# Patient Record
Sex: FEMALE
[De-identification: names, ages, dates, MRNs, and addresses within clinical notes are randomized per-mention and may not be internally consistent; named-entity substitution may affect disease eponyms.]

---

## 2017-01-10 PROBLEM — Z00.00 ENCOUNTER FOR PREVENTIVE HEALTH EXAMINATION: Status: ACTIVE | Noted: 2017-01-10

## 2017-01-17 ENCOUNTER — APPOINTMENT (OUTPATIENT)
Dept: ORTHOPEDIC SURGERY | Facility: CLINIC | Age: 22
End: 2017-01-17

## 2017-01-17 VITALS
BODY MASS INDEX: 16.07 KG/M2 | HEIGHT: 66 IN | SYSTOLIC BLOOD PRESSURE: 128 MMHG | WEIGHT: 100 LBS | DIASTOLIC BLOOD PRESSURE: 74 MMHG | HEART RATE: 90 BPM

## 2017-01-17 DIAGNOSIS — M25.562 PAIN IN RIGHT KNEE: ICD-10-CM

## 2017-01-17 DIAGNOSIS — M25.551 PAIN IN RIGHT HIP: ICD-10-CM

## 2017-01-17 DIAGNOSIS — M25.561 PAIN IN RIGHT KNEE: ICD-10-CM

## 2017-01-17 DIAGNOSIS — G89.29 PAIN IN RIGHT KNEE: ICD-10-CM

## 2017-01-17 DIAGNOSIS — M25.552 PAIN IN RIGHT HIP: ICD-10-CM

## 2021-11-12 ENCOUNTER — INPATIENT (INPATIENT)
Facility: HOSPITAL | Age: 26
LOS: 1 days | Discharge: HOME | End: 2021-11-14
Attending: INTERNAL MEDICINE | Admitting: INTERNAL MEDICINE
Payer: MEDICAID

## 2021-11-12 VITALS
TEMPERATURE: 98 F | RESPIRATION RATE: 18 BRPM | HEART RATE: 86 BPM | DIASTOLIC BLOOD PRESSURE: 68 MMHG | OXYGEN SATURATION: 100 % | SYSTOLIC BLOOD PRESSURE: 118 MMHG

## 2021-11-12 DIAGNOSIS — R00.2 PALPITATIONS: ICD-10-CM

## 2021-11-12 DIAGNOSIS — I49.3 VENTRICULAR PREMATURE DEPOLARIZATION: ICD-10-CM

## 2021-11-12 DIAGNOSIS — Z29.9 ENCOUNTER FOR PROPHYLACTIC MEASURES, UNSPECIFIED: ICD-10-CM

## 2021-11-12 DIAGNOSIS — Q87.40 MARFAN SYNDROME, UNSPECIFIED: ICD-10-CM

## 2021-11-12 LAB
A1C WITH ESTIMATED AVERAGE GLUCOSE RESULT: 5.1 % — SIGNIFICANT CHANGE UP (ref 4–5.6)
ALBUMIN SERPL ELPH-MCNC: 4.7 G/DL — SIGNIFICANT CHANGE UP (ref 3.3–5)
ALBUMIN SERPL ELPH-MCNC: 4.9 G/DL — SIGNIFICANT CHANGE UP (ref 3.3–5)
ALP SERPL-CCNC: 55 U/L — SIGNIFICANT CHANGE UP (ref 40–120)
ALP SERPL-CCNC: 55 U/L — SIGNIFICANT CHANGE UP (ref 40–120)
ALT FLD-CCNC: 13 U/L — SIGNIFICANT CHANGE UP (ref 4–33)
ALT FLD-CCNC: 16 U/L — SIGNIFICANT CHANGE UP (ref 4–33)
ANION GAP SERPL CALC-SCNC: 14 MMOL/L — SIGNIFICANT CHANGE UP (ref 7–14)
ANION GAP SERPL CALC-SCNC: 16 MMOL/L — HIGH (ref 7–14)
APPEARANCE UR: CLEAR — SIGNIFICANT CHANGE UP
APTT BLD: 31.3 SEC — SIGNIFICANT CHANGE UP (ref 27–36.3)
AST SERPL-CCNC: 19 U/L — SIGNIFICANT CHANGE UP (ref 4–32)
AST SERPL-CCNC: 26 U/L — SIGNIFICANT CHANGE UP (ref 4–32)
B-OH-BUTYR SERPL-SCNC: 3.3 MMOL/L — HIGH (ref 0–0.4)
BASE EXCESS BLDV CALC-SCNC: -4.9 MMOL/L — LOW (ref -2–3)
BASOPHILS # BLD AUTO: 0.05 K/UL — SIGNIFICANT CHANGE UP (ref 0–0.2)
BASOPHILS # BLD AUTO: 0.08 K/UL — SIGNIFICANT CHANGE UP (ref 0–0.2)
BASOPHILS NFR BLD AUTO: 0.5 % — SIGNIFICANT CHANGE UP (ref 0–2)
BASOPHILS NFR BLD AUTO: 1 % — SIGNIFICANT CHANGE UP (ref 0–2)
BILIRUB SERPL-MCNC: 0.5 MG/DL — SIGNIFICANT CHANGE UP (ref 0.2–1.2)
BILIRUB SERPL-MCNC: 0.6 MG/DL — SIGNIFICANT CHANGE UP (ref 0.2–1.2)
BILIRUB UR-MCNC: NEGATIVE — SIGNIFICANT CHANGE UP
BLD GP AB SCN SERPL QL: NEGATIVE — SIGNIFICANT CHANGE UP
BLOOD GAS VENOUS COMPREHENSIVE RESULT: SIGNIFICANT CHANGE UP
BUN SERPL-MCNC: 14 MG/DL — SIGNIFICANT CHANGE UP (ref 7–23)
BUN SERPL-MCNC: 18 MG/DL — SIGNIFICANT CHANGE UP (ref 7–23)
CALCIUM SERPL-MCNC: 9.1 MG/DL — SIGNIFICANT CHANGE UP (ref 8.4–10.5)
CALCIUM SERPL-MCNC: 9.5 MG/DL — SIGNIFICANT CHANGE UP (ref 8.4–10.5)
CHLORIDE BLDV-SCNC: 101 MMOL/L — SIGNIFICANT CHANGE UP (ref 96–108)
CHLORIDE SERPL-SCNC: 101 MMOL/L — SIGNIFICANT CHANGE UP (ref 98–107)
CHLORIDE SERPL-SCNC: 102 MMOL/L — SIGNIFICANT CHANGE UP (ref 98–107)
CO2 BLDV-SCNC: 23 MMOL/L — SIGNIFICANT CHANGE UP (ref 22–26)
CO2 SERPL-SCNC: 20 MMOL/L — LOW (ref 22–31)
CO2 SERPL-SCNC: 20 MMOL/L — LOW (ref 22–31)
COLOR SPEC: SIGNIFICANT CHANGE UP
CREAT SERPL-MCNC: 0.53 MG/DL — SIGNIFICANT CHANGE UP (ref 0.5–1.3)
CREAT SERPL-MCNC: 0.57 MG/DL — SIGNIFICANT CHANGE UP (ref 0.5–1.3)
DIFF PNL FLD: NEGATIVE — SIGNIFICANT CHANGE UP
EOSINOPHIL # BLD AUTO: 0.01 K/UL — SIGNIFICANT CHANGE UP (ref 0–0.5)
EOSINOPHIL # BLD AUTO: 0.03 K/UL — SIGNIFICANT CHANGE UP (ref 0–0.5)
EOSINOPHIL NFR BLD AUTO: 0.1 % — SIGNIFICANT CHANGE UP (ref 0–6)
EOSINOPHIL NFR BLD AUTO: 0.4 % — SIGNIFICANT CHANGE UP (ref 0–6)
ESTIMATED AVERAGE GLUCOSE: 100 — SIGNIFICANT CHANGE UP
GAS PNL BLDV: 133 MMOL/L — LOW (ref 136–145)
GAS PNL BLDV: SIGNIFICANT CHANGE UP
GLUCOSE BLDC GLUCOMTR-MCNC: 132 MG/DL — HIGH (ref 70–99)
GLUCOSE BLDC GLUCOMTR-MCNC: 132 MG/DL — HIGH (ref 70–99)
GLUCOSE BLDC GLUCOMTR-MCNC: 217 MG/DL — HIGH (ref 70–99)
GLUCOSE BLDC GLUCOMTR-MCNC: 51 MG/DL — CRITICAL LOW (ref 70–99)
GLUCOSE BLDC GLUCOMTR-MCNC: 56 MG/DL — LOW (ref 70–99)
GLUCOSE BLDV-MCNC: 191 MG/DL — HIGH (ref 70–99)
GLUCOSE SERPL-MCNC: 183 MG/DL — HIGH (ref 70–99)
GLUCOSE SERPL-MCNC: 66 MG/DL — LOW (ref 70–99)
GLUCOSE UR QL: NEGATIVE — SIGNIFICANT CHANGE UP
HCG SERPL-ACNC: <5 MIU/ML — SIGNIFICANT CHANGE UP
HCO3 BLDV-SCNC: 22 MMOL/L — SIGNIFICANT CHANGE UP (ref 22–29)
HCT VFR BLD CALC: 34.8 % — SIGNIFICANT CHANGE UP (ref 34.5–45)
HCT VFR BLD CALC: 35.7 % — SIGNIFICANT CHANGE UP (ref 34.5–45)
HCT VFR BLDA CALC: 36 % — SIGNIFICANT CHANGE UP (ref 34.5–46.5)
HGB BLD CALC-MCNC: 11.9 G/DL — SIGNIFICANT CHANGE UP (ref 11.5–15.5)
HGB BLD-MCNC: 11.7 G/DL — SIGNIFICANT CHANGE UP (ref 11.5–15.5)
HGB BLD-MCNC: 12 G/DL — SIGNIFICANT CHANGE UP (ref 11.5–15.5)
IANC: 5.69 K/UL — SIGNIFICANT CHANGE UP (ref 1.5–8.5)
IANC: 9.54 K/UL — HIGH (ref 1.5–8.5)
IMM GRANULOCYTES NFR BLD AUTO: 0.2 % — SIGNIFICANT CHANGE UP (ref 0–1.5)
IMM GRANULOCYTES NFR BLD AUTO: 0.3 % — SIGNIFICANT CHANGE UP (ref 0–1.5)
INR BLD: 1.16 RATIO — SIGNIFICANT CHANGE UP (ref 0.88–1.16)
KETONES UR-MCNC: ABNORMAL
LACTATE BLDV-MCNC: 1.2 MMOL/L — SIGNIFICANT CHANGE UP (ref 0.5–2)
LEUKOCYTE ESTERASE UR-ACNC: NEGATIVE — SIGNIFICANT CHANGE UP
LYMPHOCYTES # BLD AUTO: 0.83 K/UL — LOW (ref 1–3.3)
LYMPHOCYTES # BLD AUTO: 1.86 K/UL — SIGNIFICANT CHANGE UP (ref 1–3.3)
LYMPHOCYTES # BLD AUTO: 22.9 % — SIGNIFICANT CHANGE UP (ref 13–44)
LYMPHOCYTES # BLD AUTO: 7.7 % — LOW (ref 13–44)
MAGNESIUM SERPL-MCNC: 2.2 MG/DL — SIGNIFICANT CHANGE UP (ref 1.6–2.6)
MCHC RBC-ENTMCNC: 29.1 PG — SIGNIFICANT CHANGE UP (ref 27–34)
MCHC RBC-ENTMCNC: 29.3 PG — SIGNIFICANT CHANGE UP (ref 27–34)
MCHC RBC-ENTMCNC: 33.6 GM/DL — SIGNIFICANT CHANGE UP (ref 32–36)
MCHC RBC-ENTMCNC: 33.6 GM/DL — SIGNIFICANT CHANGE UP (ref 32–36)
MCV RBC AUTO: 86.7 FL — SIGNIFICANT CHANGE UP (ref 80–100)
MCV RBC AUTO: 87.2 FL — SIGNIFICANT CHANGE UP (ref 80–100)
MONOCYTES # BLD AUTO: 0.36 K/UL — SIGNIFICANT CHANGE UP (ref 0–0.9)
MONOCYTES # BLD AUTO: 0.45 K/UL — SIGNIFICANT CHANGE UP (ref 0–0.9)
MONOCYTES NFR BLD AUTO: 3.3 % — SIGNIFICANT CHANGE UP (ref 2–14)
MONOCYTES NFR BLD AUTO: 5.5 % — SIGNIFICANT CHANGE UP (ref 2–14)
NEUTROPHILS # BLD AUTO: 5.69 K/UL — SIGNIFICANT CHANGE UP (ref 1.8–7.4)
NEUTROPHILS # BLD AUTO: 9.54 K/UL — HIGH (ref 1.8–7.4)
NEUTROPHILS NFR BLD AUTO: 70 % — SIGNIFICANT CHANGE UP (ref 43–77)
NEUTROPHILS NFR BLD AUTO: 88.1 % — HIGH (ref 43–77)
NITRITE UR-MCNC: NEGATIVE — SIGNIFICANT CHANGE UP
NRBC # BLD: 0 /100 WBCS — SIGNIFICANT CHANGE UP
NRBC # BLD: 0 /100 WBCS — SIGNIFICANT CHANGE UP
NRBC # FLD: 0 K/UL — SIGNIFICANT CHANGE UP
NRBC # FLD: 0 K/UL — SIGNIFICANT CHANGE UP
NT-PROBNP SERPL-SCNC: 319 PG/ML — HIGH
PCO2 BLDV: 45 MMHG — HIGH (ref 39–42)
PH BLDV: 7.29 — LOW (ref 7.32–7.43)
PH UR: 6.5 — SIGNIFICANT CHANGE UP (ref 5–8)
PHOSPHATE SERPL-MCNC: 2.7 MG/DL — SIGNIFICANT CHANGE UP (ref 2.5–4.5)
PLATELET # BLD AUTO: 194 K/UL — SIGNIFICANT CHANGE UP (ref 150–400)
PLATELET # BLD AUTO: 211 K/UL — SIGNIFICANT CHANGE UP (ref 150–400)
PO2 BLDV: 33 MMHG — SIGNIFICANT CHANGE UP
POTASSIUM BLDV-SCNC: 3.8 MMOL/L — SIGNIFICANT CHANGE UP (ref 3.5–5.1)
POTASSIUM SERPL-MCNC: 3.7 MMOL/L — SIGNIFICANT CHANGE UP (ref 3.5–5.3)
POTASSIUM SERPL-MCNC: 3.7 MMOL/L — SIGNIFICANT CHANGE UP (ref 3.5–5.3)
POTASSIUM SERPL-SCNC: 3.7 MMOL/L — SIGNIFICANT CHANGE UP (ref 3.5–5.3)
POTASSIUM SERPL-SCNC: 3.7 MMOL/L — SIGNIFICANT CHANGE UP (ref 3.5–5.3)
PROT SERPL-MCNC: 7.7 G/DL — SIGNIFICANT CHANGE UP (ref 6–8.3)
PROT SERPL-MCNC: 7.8 G/DL — SIGNIFICANT CHANGE UP (ref 6–8.3)
PROT UR-MCNC: ABNORMAL
PROTHROM AB SERPL-ACNC: 13.1 SEC — SIGNIFICANT CHANGE UP (ref 10.6–13.6)
RBC # BLD: 3.99 M/UL — SIGNIFICANT CHANGE UP (ref 3.8–5.2)
RBC # BLD: 4.12 M/UL — SIGNIFICANT CHANGE UP (ref 3.8–5.2)
RBC # FLD: 12.4 % — SIGNIFICANT CHANGE UP (ref 10.3–14.5)
RBC # FLD: 12.5 % — SIGNIFICANT CHANGE UP (ref 10.3–14.5)
RH IG SCN BLD-IMP: POSITIVE — SIGNIFICANT CHANGE UP
SAO2 % BLDV: 53.7 % — SIGNIFICANT CHANGE UP
SARS-COV-2 RNA SPEC QL NAA+PROBE: SIGNIFICANT CHANGE UP
SODIUM SERPL-SCNC: 135 MMOL/L — SIGNIFICANT CHANGE UP (ref 135–145)
SODIUM SERPL-SCNC: 138 MMOL/L — SIGNIFICANT CHANGE UP (ref 135–145)
SP GR SPEC: >1.05 (ref 1–1.05)
TROPONIN T, HIGH SENSITIVITY RESULT: <6 NG/L — SIGNIFICANT CHANGE UP
UROBILINOGEN FLD QL: SIGNIFICANT CHANGE UP
WBC # BLD: 10.82 K/UL — HIGH (ref 3.8–10.5)
WBC # BLD: 8.13 K/UL — SIGNIFICANT CHANGE UP (ref 3.8–10.5)
WBC # FLD AUTO: 10.82 K/UL — HIGH (ref 3.8–10.5)
WBC # FLD AUTO: 8.13 K/UL — SIGNIFICANT CHANGE UP (ref 3.8–10.5)

## 2021-11-12 PROCEDURE — 99233 SBSQ HOSP IP/OBS HIGH 50: CPT | Mod: 25

## 2021-11-12 PROCEDURE — 93306 TTE W/DOPPLER COMPLETE: CPT | Mod: 26

## 2021-11-12 PROCEDURE — 71275 CT ANGIOGRAPHY CHEST: CPT | Mod: 26,MG

## 2021-11-12 PROCEDURE — 75635 CT ANGIO ABDOMINAL ARTERIES: CPT | Mod: 26,MA

## 2021-11-12 PROCEDURE — 99223 1ST HOSP IP/OBS HIGH 75: CPT | Mod: GC

## 2021-11-12 PROCEDURE — G1004: CPT

## 2021-11-12 PROCEDURE — 93280 PM DEVICE PROGR EVAL DUAL: CPT | Mod: 26

## 2021-11-12 PROCEDURE — 99285 EMERGENCY DEPT VISIT HI MDM: CPT

## 2021-11-12 RX ORDER — LOSARTAN POTASSIUM 100 MG/1
25 TABLET, FILM COATED ORAL
Refills: 0 | Status: DISCONTINUED | OUTPATIENT
Start: 2021-11-12 | End: 2021-11-12

## 2021-11-12 RX ORDER — DEXTROSE 50 % IN WATER 50 %
50 SYRINGE (ML) INTRAVENOUS ONCE
Refills: 0 | Status: COMPLETED | OUTPATIENT
Start: 2021-11-12 | End: 2021-11-12

## 2021-11-12 RX ORDER — ACETAMINOPHEN 500 MG
650 TABLET ORAL ONCE
Refills: 0 | Status: COMPLETED | OUTPATIENT
Start: 2021-11-12 | End: 2021-11-12

## 2021-11-12 RX ORDER — ATENOLOL 25 MG/1
25 TABLET ORAL DAILY
Refills: 0 | Status: DISCONTINUED | OUTPATIENT
Start: 2021-11-12 | End: 2021-11-12

## 2021-11-12 RX ORDER — ASPIRIN/CALCIUM CARB/MAGNESIUM 324 MG
81 TABLET ORAL DAILY
Refills: 0 | Status: DISCONTINUED | OUTPATIENT
Start: 2021-11-12 | End: 2021-11-14

## 2021-11-12 RX ORDER — MORPHINE SULFATE 50 MG/1
4 CAPSULE, EXTENDED RELEASE ORAL ONCE
Refills: 0 | Status: DISCONTINUED | OUTPATIENT
Start: 2021-11-12 | End: 2021-11-12

## 2021-11-12 RX ORDER — ENOXAPARIN SODIUM 100 MG/ML
40 INJECTION SUBCUTANEOUS DAILY
Refills: 0 | Status: DISCONTINUED | OUTPATIENT
Start: 2021-11-12 | End: 2021-11-12

## 2021-11-12 RX ORDER — ACETAMINOPHEN 500 MG
650 TABLET ORAL EVERY 6 HOURS
Refills: 0 | Status: DISCONTINUED | OUTPATIENT
Start: 2021-11-12 | End: 2021-11-14

## 2021-11-12 RX ORDER — ENOXAPARIN SODIUM 100 MG/ML
40 INJECTION SUBCUTANEOUS DAILY
Refills: 0 | Status: DISCONTINUED | OUTPATIENT
Start: 2021-11-12 | End: 2021-11-14

## 2021-11-12 RX ORDER — ONDANSETRON 8 MG/1
4 TABLET, FILM COATED ORAL ONCE
Refills: 0 | Status: COMPLETED | OUTPATIENT
Start: 2021-11-12 | End: 2021-11-12

## 2021-11-12 RX ORDER — ATENOLOL 25 MG/1
25 TABLET ORAL DAILY
Refills: 0 | Status: DISCONTINUED | OUTPATIENT
Start: 2021-11-12 | End: 2021-11-14

## 2021-11-12 RX ADMIN — MORPHINE SULFATE 4 MILLIGRAM(S): 50 CAPSULE, EXTENDED RELEASE ORAL at 18:02

## 2021-11-12 RX ADMIN — Medication 50 MILLILITER(S): at 18:47

## 2021-11-12 RX ADMIN — Medication 650 MILLIGRAM(S): at 18:12

## 2021-11-12 RX ADMIN — ONDANSETRON 4 MILLIGRAM(S): 8 TABLET, FILM COATED ORAL at 18:12

## 2021-11-12 RX ADMIN — MORPHINE SULFATE 4 MILLIGRAM(S): 50 CAPSULE, EXTENDED RELEASE ORAL at 15:01

## 2021-11-12 NOTE — ED PROVIDER NOTE - NSICDXPASTMEDICALHX_GEN_ALL_CORE_FT
PAST MEDICAL HISTORY:  History of open heart surgery     History of transcatheter aortic valve replacement (TAVR)     Marfan syndrome

## 2021-11-12 NOTE — H&P ADULT - NSHPSOCIALHISTORY_GEN_ALL_CORE
Patient denies tobacco use. Endorses previous alcohol use (8 shots per weekend) but has not had any alcohol in two weeks. Denies illicit drug use. Not sexually active.

## 2021-11-12 NOTE — H&P ADULT - PROBLEM SELECTOR PLAN 2
Patient with a history of Marfan syndrome  - management of palpitations as above   - CT with multilobulated meningoceles within the sacrum along with dural ectasia.   - patient previously referred for MRI but has not had scan   - neuro consult? Patient with a history of Marfan syndrome  - management of palpitations as above   - CT with multilobulated meningoceles within the sacrum along with dural ectasia.   - patient previously referred for MRI but has not had scan   - neuro consult 11/13

## 2021-11-12 NOTE — ED ADULT NURSE NOTE - OBJECTIVE STATEMENT
Receive pt. in ER room 15 alert and oriented x 4, presenting to the ER with complaints of midsternal chest pain . Pt. stated " I was at work this morning and I  have chest pain in the middle of my chest, I went to my doctor and I was sent to the ER". Place on cardiac monitor, labs sent. No respiratory distress, will continue to monitor.

## 2021-11-12 NOTE — H&P ADULT - PROBLEM SELECTOR PLAN 1
Patient with history of Marfan syndrome, presenting with palpitations   - EP following: PPM interrogation revealed normal device functions without VT or NSVT and PVC's up to 312 beats per hour since March 2021.  Total V pacing around 3%.    - CT of chest ruled out aortic dissection and hematoma.    - Patient has normal LVEF 60% and no hx of heart failure per PPM records.    - Admit to tele   -Continue Atenolol 25mg  -Echocardiogram to assess valvular conditions Patient with history of Marfan syndrome, presenting with palpitations   - EP following: PPM interrogation revealed normal device functions without VT or NSVT and PVC's up to 312 beats per hour since March 2021.  Total V pacing around 3%.    - CT of chest ruled out aortic dissection and hematoma.    - Patient has normal LVEF 60% and no hx of heart failure per PPM records.    - Admit to tele   -Continue Atenolol 25mg  -Echocardiogram to for CHF and valvular conditions Patient with history of Marfan syndrome, presenting with palpitations   - EP following: PPM interrogation revealed normal device functions without VT or NSVT and PVC's up to 312 beats per hour since March 2021.  Total V pacing around 3%.    - CT of chest ruled out aortic dissection and hematoma.    - Patient has normal LVEF 60% and no hx of heart failure per PPM records  - Admit to tele   -Continue Atenolol 25mg, asa, losartan   -Echocardiogram to for CHF and valvular conditions

## 2021-11-12 NOTE — ED PROVIDER NOTE - OBJECTIVE STATEMENT
27yo F w/ history of Marfan syndrome, open heart sx 2012 and 2017, TAVR Mar 2021 coming in w/ acute onset midsternal chest pain radiating to the back associated w/ SOB that began after running at work. Has never had similar symptoms in the past; went to the cardiologist and was tachy to the 140s and was told to come to the ED.

## 2021-11-12 NOTE — H&P ADULT - NSHPLABSRESULTS_GEN_ALL_CORE
CBC Full  -  ( 12 Nov 2021 13:51 )  WBC Count : 8.13 K/uL  RBC Count : 4.12 M/uL  Hemoglobin : 12.0 g/dL  Hematocrit : 35.7 %  Platelet Count - Automated : 211 K/uL  Mean Cell Volume : 86.7 fL  Mean Cell Hemoglobin : 29.1 pg  Mean Cell Hemoglobin Concentration : 33.6 gm/dL  Auto Neutrophil # : 5.69 K/uL  Auto Lymphocyte # : 1.86 K/uL  Auto Monocyte # : 0.45 K/uL  Auto Eosinophil # : 0.03 K/uL  Auto Basophil # : 0.08 K/uL  Auto Neutrophil % : 70.0 %  Auto Lymphocyte % : 22.9 %  Auto Monocyte % : 5.5 %  Auto Eosinophil % : 0.4 %  Auto Basophil % : 1.0 %    Comprehensive Metabolic Panel (11.12.21 @ 13:51)    Sodium, Serum: 138 mmol/L    Potassium, Serum: 3.7: SPECIMEN MILDLY HEMOLYZED mmol/L    Chloride, Serum: 102 mmol/L    Carbon Dioxide, Serum: 20 mmol/L    Anion Gap, Serum: 16 mmol/L    Blood Urea Nitrogen, Serum: 18 mg/dL    Creatinine, Serum: 0.57 mg/dL    Glucose, Serum: 66 mg/dL    Calcium, Total Serum: 9.5 mg/dL    Protein Total, Serum: 7.8: SPECIMEN MILDLY HEMOLYZED g/dL    Albumin, Serum: 4.9 g/dL    Bilirubin Total, Serum: 0.6 mg/dL    Alkaline Phosphatase, Serum: 55 U/L    Aspartate Aminotransferase (AST/SGOT): 26: SPECIMEN MILDLY HEMOLYZED U/L    Alanine Aminotransferase (ALT/SGPT): 16: SPECIMEN MILDLY HEMOLYZED U/L    eGFR if Non : 126: The units for eGFR are ml/min/1.73m2 (normalized body surface area). The  eGFR is calculated from a serum creatinine using the CKD-EPI equation.  Other variables required for calculation are race, age and sex. Among  patients with chronic kidney disease (CKD), the eGFR is useful in  determining the stage of disease according to KDOQI CKD classification.  All eGFR results are reported numerically with the following  interpretation.      GFR  (ml/min/1.73 m2)          W/KIDNEY DAMAGE    W/O KIDNEY DMG  ==========================================================  >= 90.......................Stage 1..............Normal  60-89.......................Stage 2...........Decreased GFR  30-59.......................Stage 3..............Stage 3  15-29.......................Stage 4..............Stage 4  < 15........................Stage 5..............Stage 5  Each stage of CKD assumes that the associated GFR level has been in  effect for at least 3 months. Determination of stages one and two (with  eGFR > 59ml/min/m2) requires estimation of kidney damage for at least 3  months as defined by structural or functional abnormalities.  Limitations: All estimates of GFR will be less accurate for patients at  extremes of muscle mass (including but not limited to frail elderly,  critically ill, or cancer patients), those with unusual diets, and those  with conditions associated with reduced secretion or extrarenal  elimination of creatinine. The eGFR equation is not recommended for use  in patients with unstable creatinine levels. mL/min/1.73M2    eGFR if African American: 146 mL/min/1.73M2      < from: CT Angio Chest Aorta w/wo IV Cont (11.12.21 @ 13:58) >      No intramural hematoma or aortic dissection.    Multilobulated meningoceles within the sacrum along with dural ectasia. Correlate for history of neurofibromatosis.      < end of copied text >

## 2021-11-12 NOTE — CONSULT NOTE ADULT - SUBJECTIVE AND OBJECTIVE BOX
Patient is a 26y old  Female who presents with a chief complaint of         HPI:  26 year old female with a PMH of Marfan syndrome s/p MVR 2012 and s/p AVR and  ascending aortic repair  in 2017 s/p transcatheter aortic valve replacement (TAVR) 3/2021 and s/p PPM ([employer]) for occasional 2 rd and 3 rd degree AVB post TAVR in 3/28/2021, scoliosis s/p repair with rods, s/p limbs deformatum with chito foot surgery and R hand surgery.  Patient recently moved from [place] to [place] and started a new job last weekend.  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  She describes "became lightheadedness and felt like passing out".  She went to see cardiologist Dr. Albright who sent her to ED to be seen by EP Dr. Yancey for symptomatic PVC's.  PPM interrogation revealed normal device functions without VT or NSVT and PVC's up to 312 beats per hour since March 2021.  Total V pacing around 3%.  She is maintained on Atenolol 25 mg daily.  CT of chest ruled out aortic dissection and hematoma.  Patient has normal LVEF 60% and no hx of heart failure per PPM records.          PAST MEDICAL & SURGICAL HISTORY:  Marfan syndrome    History of open heart surgery s/p MVR 2012 and s/p AVR and  ascending aortic repair  in 2017    History of transcatheter aortic valve replacement (TAVR) 3/2021 and s/p PPM ([employer]tr[employer]) in 3/28/2021  s/p chito foot surgery and R hand surgery  s/p spine surgery for scoliosis         MEDICATIONS  (STANDING): Atenolol 25 mg daily; ASA 81 mg daily; Losartan 25 mg BID    MEDICATIONS  (PRN):    Allergies    No Known Allergies    Intolerances      FAMILY HISTORY: N/A      SOCIAL HISTORY:  Denies smoking; no   Alcohol  or  Drug abuse     REVIEW OF SYSTEMS:    CONSTITUTIONAL: No fever, weight loss, chills, shakes, or fatigue  EYES: No eye pain, visual disturbances, or discharge  ENMT:  No difficulty hearing, tinnitus, vertigo; No sinus or throat pain  NECK: No pain or stiffness  RESPIRATORY: No cough, wheezing, hemoptysis, or shortness of breath  CARDIOVASCULAR: No syncope, paroxysmal nocturnal dyspnea, orthopnea, or arm or leg swelling +chest pain, +dyspnea, +palpitations, +dizziness,   GASTROINTESTINAL: No abdominal  or epigastric pain, nausea, vomiting, hematemesis, diarrhea, constipation, melena or bright red blood.  GENITOURINARY: No dysuria, nocturia, hematuria, or urinary incontinence  NEUROLOGICAL: No headaches, memory loss, slurred speech, limb weakness, loss of strength,  or tremors +numbness/tingling of chito feet  SKIN: No itching, burning, rashes, or lesions   LYMPH NODES: No enlarged glands  ENDOCRINE: No heat or cold intolerance, or hair loss  MUSCULOSKELETAL: No joint pain or swelling, muscle, back, or extremity pain  PSYCHIATRIC: No depression, anxiety, or difficulty sleeping  HEME/LYMPH: No easy bruising or bleeding gums  ALLERY AND IMMUNOLOGIC: No hives or rash.      Vital Signs Last 24 Hrs  T(C): 36.8 (12 Nov 2021 12:59), Max: 36.8 (12 Nov 2021 12:59)  T(F): 98.2 (12 Nov 2021 12:59), Max: 98.2 (12 Nov 2021 12:59)  HR: 85 (12 Nov 2021 14:27) (85 - 86)  BP: 104/67 (12 Nov 2021 14:27) (104/67 - 118/68)  BP(mean): --  RR: 16 (12 Nov 2021 14:27) (16 - 18)  SpO2: 100% (12 Nov 2021 14:27) (100% - 100%)    PHYSICAL EXAM:    GENERAL: In no apparent distress, well nourished, and hydrated.  HEAD:  Atraumatic, Normocephalic  NECK: Supple . No JVD or carotid bruit or thyroidmegaly.  Carotid pulse is 2+ bilaterally.  PULMONARY: Clear to auscultation and perfusion.  No rales, wheezing, or rhonchi bilaterally.  HEART: Regular rate and rhythm; 1/6 murmurs, rubs, or gallops.  L PPM  ABDOMEN: Soft, Nontender, Nondistended; Bowel sounds present  EXTREMITIES: No clubbing, cyanosis, or edema  NEUROLOGICAL: Alert oriented to person, place and time.  Speech clear.  Skin: Dry intact, no rashes +scars              INTERPRETATION OF TELEMETRY:  Sinus rhythm with occasional PVC's    ECG: SR, RBBB        LABS:                        12.0   8.13  )-----------( 211      ( 12 Nov 2021 13:51 )             35.7     11-12    138  |  102  |  18  ----------------------------<  66<L>  3.7   |  20<L>  |  0.57    Ca    9.5      12 Nov 2021 13:51    TPro  7.8  /  Alb  4.9  /  TBili  0.6  /  DBili  x   /  AST  26  /  ALT  16  /  AlkPhos  55  11-12        PT/INR - ( 12 Nov 2021 13:51 )   PT: 13.1 sec;   INR: 1.16 ratio         PTT - ( 12 Nov 2021 13:51 )  PTT:31.3 sec      BNPSerum Pro-Brain Natriuretic Peptide: 319 pg/mL (11-12 @ 13:51)    RADIOLOGY & ADDITIONAL STUDIES:  PREVIOUS DIAGNOSTIC TESTING:      ECHO FINDINGS:    STRESS FINDINGS:    CATHETERIZATION FINDINGS:

## 2021-11-12 NOTE — CONSULT NOTE ADULT - ASSESSMENT
26 year old female with a PMH of Marfan syndrome s/p MVR 2012 and s/p AVR and  ascending aortic repair  in 2017 s/p transcatheter aortic valve replacement (TAVR) 3/2021 and s/p PPM ([employer]) for occasional 2 rd and 3 rd degree AVB post TAVR in 3/28/2021, scoliosis s/p repair with rods, s/p limbs deformatum with chito foot surgery and R hand surgery.  PPM interrogation revealed normal device functions without VT or NSVT and PVC's up to 312 beats per hour since March 2021.  Total V pacing around 3%.  She is maintained on Atenolol 25 mg daily.  Patient 's symptoms probably secondary to frequent ectopy associated with exertion.   CT of chest ruled out aortic dissection and hematoma.  Patient has normal LVEF 60% and no hx of heart failure per PPM records.    -Admit to Hospitalist service to tele bed, telemetry monitor   -EKG / rhythm strips to record PVC's  -Continue Atenolol 25mg, may uptitrate to 50mg daily for frequent PVC's   -Echocardiogram to assess valvular conditions   -Will discuss with Dr. Yancey, patient may be a candidate for PVC's ablation vs antiarrhythmic drug for ectopy suppression

## 2021-11-12 NOTE — CONSULT NOTE ADULT - ATTENDING COMMENTS
26 year old female with a PMH of Marfan syndrome s/p MVR 2012 and s/p AVR and  ascending aortic repair  in 2017 s/p transcatheter aortic valve replacement (TAVR) 3/2021 and s/p PPM ([employer]) for occasional 2 rd and 3 rd degree AVB post TAVR in 3/28/2021, scoliosis s/p repair with rods, s/p limbs deformatum with chito foot surgery and R hand surgery.  PPM interrogation revealed normal device functions without VT or NSVT and PVC's up to 312 beats per hour since March 2021.  Total V pacing around 3%.  She is maintained on Atenolol 25 mg daily.  Patient 's symptoms probably secondary to frequent ectopy associated with exertion.   CT of chest ruled out aortic dissection and PE.  Patient has normal LVEF 60% and no hx of heart failure per PPM records.    Will plan for TTE and follow.

## 2021-11-12 NOTE — H&P ADULT - ATTENDING COMMENTS
Patient seen and examined on 11/12 at 1800.     26 y.o. F w/ a hx of Marfan’s syndrome s/p aortic root repair, MVR, TAVR, scoliosis, high degree AVB s/p PPM who presents with acute on chronic SOB. Patient recently underwent TAVR repair in March 2021 for severe SOB. Dyspnea improved until 3 months ago when she noted she was able to walk 5 blocks before [place] compared to 10 blocks. She became SOB after climbing one flight of stairs. Patient recently moved from [place] and has not seen her cardiologist since developing SOB. Does not remember when she had her last TTE. Pt was at work today, running around when she noted severe SOB associated with central chest pressure with involvement of L arm. Patient does report chronic L shoulder/arm pain since PPM placement. Since arrival to the ED, patient has been nauseous, she believes 2/2 lack of PO intake. PE notable for +occasional dry heaving. Labs unremarkable. CTA chest negative for aortic dissection, PNX, PNA but does demonstrate multilobulated meningoceles in sacrum.     # SOB, chest pressure: CTA chest negative for aortic dissection. Check TTE given hx of MVR, TAVR, evaluate valve function. DD includes symptomatic ectopy- EP following. Try to obtain records from prior providers. Monitor on tele.   # Meningocele: F/u OP.

## 2021-11-12 NOTE — ED ADULT TRIAGE NOTE - BRAND OF COVID-19 VACCINATION
Patient contacted no answer on home or cell. Left message on home phone.     Per recommendations from SARAH Lance  Start Metamucil or Citrucel 2 capsules daily and increase by one capsule every 7-10 days. Should not exceed 6-7 capsules daily.  Continue on bland diet  Further recommendations to follow endoscopies   Pfizer dose 1

## 2021-11-12 NOTE — H&P ADULT - ASSESSMENT
-Admit to Hospitalist service to tele bed, telemetry monitor   -EKG / rhythm strips to record PVC's  -Continue Atenolol 25mg, may uptitrate to 50mg daily for frequent PVC's   -Echocardiogram to assess valvular conditions   -Will discuss with Dr. Yancey, patient may be a candidate for PVC's ablation vs antiarrhythmic drug for ectopy suppression -Admit to Hospitalist service to tele bed, telemetry monitor   -EKG / rhythm strips to record PVC's  -Continue Atenolol 25mg, may uptitrate to 50mg daily for frequent PVC's   -Echocardiogram to assess valvular conditions   -Will discuss with Dr. Yancey, patient may be a candidate for PVC's ablation vs antiarrhythmic drug for ectopy suppression    EP: PPM interrogation revealed normal device functions without VT or NSVT and PVC's up to 312 beats per hour since March 2021.  Total V pacing around 3%.  She is maintained on Atenolol 25 mg daily. CT of chest ruled out aortic dissection and hematoma.  Patient has normal LVEF 60% and no hx of heart failure per PPM records.   26 year old female with a PMH of Marfan syndrome presenting with chest pain and palpitations, referred to [facility] ED by cardiologist Dr. Albright for evaluation by EP Dr. Yancey.

## 2021-11-12 NOTE — ED PROVIDER NOTE - PHYSICAL EXAMINATION
GENERAL: well appearing in no acute distress, non-toxic appearing  HEAD: normocephalic, atraumatic  HENT: airway intact, neck supple  EYES: normal conjunctiva, EOMI, PERRL  CARDIAC: regular rate and rhythm, normal S1S2, no appreciable murmurs, 2+ pulses in UE/LE b/l  PULM: normal breath sounds, clear to ascultation bilaterally, no rales, rhonchi, wheezing  GI: abdomen nondistended, soft, nontender, no guarding, rebound tenderness  NEURO: no focal motor or sensory deficits, CN2-12 intact, normal speech, AAOx3  MSK: no peripheral edema, no calf tenderness b/l  SKIN: well-perfused, extremities warm, no visible rashes  PSYCH: appropriate mood and affect

## 2021-11-12 NOTE — PROCEDURE NOTE - INTERROGATION NOTE: COMMENTS
Normal sensing pacing via iterative testing, excellent threshold capture, no events recorded, no reprogramming

## 2021-11-12 NOTE — H&P ADULT - NSHPREVIEWOFSYSTEMS_GEN_ALL_CORE
CONSTITUTIONAL: No fevers or chills  EYES/ENT: No visual changes;  No vertigo or throat pain   NECK: No pain or stiffness  RESPIRATORY: No cough, wheezing, hemoptysis; +SOB  CARDIOVASCULAR: + chest pain and palpitations   GASTROINTESTINAL: No abdominal or epigastric pain. +nausea, no vomiting, or hematemesis; No diarrhea or constipation. No melena or hematochezia.  GENITOURINARY: No dysuria, frequency or hematuria  NEUROLOGICAL: No numbness   MUSCULOSKELETAL: No myalgias, arthralgias, or joint swelling  SKIN: No itching, rashes

## 2021-11-12 NOTE — H&P ADULT - HISTORY OF PRESENT ILLNESS
26 year old female with a PMH of Marfan syndrome s/p MVR 2012 and s/p AVR and  ascending aortic repair in 2017 s/p transcatheter aortic valve replacement (TAVR) 3/2021 and s/p PPM ([employer]) for occasional 2rd and 3rd degree AVB post TAVR in 3/28/2021, scoliosis s/p repair with rods, s/p limbs deformatum with chito foot surgery and R hand surgery.  Patient recently moved from [place] to [place] and started a new job last weekend.  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  She describes "became lightheadedness and felt like passing out".  She went to see cardiologist Dr. Albright who sent her to ED to be seen by EP Dr. Yancey for symptomatic PVC's.  PPM interrogation revealed normal device functions without VT or NSVT and PVC's up to 312 beats per hour since March 2021.  Total V pacing around 3%.  She is maintained on Atenolol 25 mg daily.  CT of chest ruled out aortic dissection and hematoma.  Patient has normal LVEF 60% and no hx of heart failure per PPM records.   26 year old female with a PMH of Marfan syndrome s/p MVR 2012 and s/p AVR and  ascending aortic repair in 2017 s/p transcatheter aortic valve replacement (TAVR) 3/2021 and s/p PPM ([employer]) for occasional 2rd and 3rd degree AVB post TAVR in 3/28/2021, scoliosis s/p repair with rods, s/p limbs deformatum with chito foot surgery and R hand surgery.  Patient recently moved from [place] to [place] and started a new job last weekend.  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 She also became lightheadedness and felt like she was going to pass out.  She went to see cardiologist Dr. Albright who sent her to ED to be seen by EP Dr. Yancey for symptomatic PVC's.      EP: PPM interrogation revealed normal device functions without VT or NSVT and PVC's up to 312 beats per hour since March 2021.  Total V pacing around 3%.  She is maintained on Atenolol 25 mg daily. CT of chest ruled out aortic dissection and hematoma.  Patient has normal LVEF 60% and no hx of heart failure per PPM records.   26 year old female with a PMH of Marfan syndrome s/p MVR 2012 and s/p AVR and  ascending aortic repair in 2017 s/p transcatheter aortic valve replacement (TAVR) 3/2021 and s/p PPM ([employer]) for occasional 2rd and 3rd degree AVB post TAVR in 3/28/2021, scoliosis s/p repair with rods, s/p limbs deformatum with chito foot surgery and R hand surgery.  Patient recently moved from [place] to [place] and started a new job last weekend.  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 She also became lightheadedness and felt like she was going to pass out.  She went to see cardiologist Dr. Albright who sent her to ED to be seen by EP Dr. Yancey for symptomatic PVC's.   26 year old female with a PMH of Marfan syndrome s/p MVR 2012 and s/p AVR and  ascending aortic repair in 2017 s/p transcatheter aortic valve replacement (TAVR) 3/2021 and s/p PPM ([employer]) for occasional 2rd and 3rd degree AVB post TAVR in 3/28/2021, scoliosis s/p repair with rods, s/p limbs deformatum with chito foot surgery and R hand surgery.      Patient recently moved from [place] to [place] and started a new job a week prior to admission. On the day before admission patient states she was feeling increased fatigue and shortness of breath at rest. 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 She went to see cardiologist Dr. Albright who sent her to ED to be seen by EP Dr. Yancey for symptomatic PVC's. Patient denies fever chills, vomiting, diarrhea, constipation, abdominal pain, dysuria, abnormal vaginal bleeding. Endorses nausea and a mild headache

## 2021-11-12 NOTE — ED PROVIDER NOTE - CLINICAL SUMMARY MEDICAL DECISION MAKING FREE TEXT BOX
27yo F w/ history of Marfan syndrome, open heart sx 2012 and 2017, TAVR Mar 2021 coming in w/ acute onset midsternal chest pain radiating to the back associated w/ SOB; will evaluate for aortic dissection

## 2021-11-12 NOTE — ED PROVIDER NOTE - NS ED ROS FT
General: denies fever, chills  HENT: denies nasal congestion, rhinorrhea  Eyes: denies visual changes, blurred vision  CV: + chest pain, palpitations  Resp: + difficulty breathing, no cough  Abdominal: denies nausea, vomiting, diarrhea, abdominal pain  : denies urinary pain or discharge  MSK: denies muscle aches, leg swelling  Neuro: denies headaches, numbness, tingling  Skin: denies rashes, bruises

## 2021-11-12 NOTE — H&P ADULT - NSHPPHYSICALEXAM_GEN_ALL_CORE
Vital Signs Last 24 Hrs  T(C): 36.6 (12 Nov 2021 16:08), Max: 36.8 (12 Nov 2021 12:59)  T(F): 97.8 (12 Nov 2021 16:08), Max: 98.2 (12 Nov 2021 12:59)  HR: 83 (12 Nov 2021 16:08) (83 - 86)  BP: 104/57 (12 Nov 2021 16:08) (104/57 - 118/68)  BP(mean): --  RR: 18 (12 Nov 2021 16:08) (16 - 19)  SpO2: 100% (12 Nov 2021 16:08) (100% - 100%)    CONSTITUTIONAL: NAD  EYES: PERRLA; conjunctiva and sclera clear  ENMT: Moist oral mucosa, no pharyngeal injection or exudates; normal dentition  RESPIRATORY: Normal respiratory effort; lungs are clear to auscultation bilaterally  CARDIOVASCULAR: Regular rate and rhythm; systolic murmur following TAVR. No lower extremity edema; Peripheral pulses are 2+ bilaterally  ABDOMEN: Nontender to palpation, normoactive bowel sounds, no rebound/guarding  MUSCULOSKELETAL:  No joint swelling or tenderness to palpation  PSYCH: A+O to person, place, and time; affect appropriate  NEUROLOGY: grossly intact   SKIN: No rashes; no palpable lesions

## 2021-11-12 NOTE — ED PROVIDER NOTE - ATTENDING CONTRIBUTION TO CARE
26 year old female with Marfan and open heart surgery in 2012 and 2017 with TAVR 3/21 came to the ED because of midsternal chest liberty radiating to the back, CTA noted, pt seen by EP, will admit. On exam, aaox3, rrr, lungs cta, abd soft, no edema, no calf tenderness.

## 2021-11-12 NOTE — ED ADULT TRIAGE NOTE - CHIEF COMPLAINT QUOTE
Pt c/o chest pain, epigastric pain, and palpitations at work today at 8am. Also c/o JACKSON. Denies dizziness at this time. Sent by cardiologist for CT. PMHx Marfan syndrome, open heart sx 2012 and 2017, TAVR Mar 2021

## 2021-11-13 ENCOUNTER — TRANSCRIPTION ENCOUNTER (OUTPATIENT)
Age: 26
End: 2021-11-13

## 2021-11-13 LAB
ALBUMIN SERPL ELPH-MCNC: 3.9 G/DL — SIGNIFICANT CHANGE UP (ref 3.3–5)
ALP SERPL-CCNC: 47 U/L — SIGNIFICANT CHANGE UP (ref 40–120)
ALT FLD-CCNC: 12 U/L — SIGNIFICANT CHANGE UP (ref 4–33)
ANION GAP SERPL CALC-SCNC: 11 MMOL/L — SIGNIFICANT CHANGE UP (ref 7–14)
AST SERPL-CCNC: 16 U/L — SIGNIFICANT CHANGE UP (ref 4–32)
BASOPHILS # BLD AUTO: 0.05 K/UL — SIGNIFICANT CHANGE UP (ref 0–0.2)
BASOPHILS NFR BLD AUTO: 0.8 % — SIGNIFICANT CHANGE UP (ref 0–2)
BILIRUB SERPL-MCNC: 0.4 MG/DL — SIGNIFICANT CHANGE UP (ref 0.2–1.2)
BUN SERPL-MCNC: 14 MG/DL — SIGNIFICANT CHANGE UP (ref 7–23)
CALCIUM SERPL-MCNC: 8.6 MG/DL — SIGNIFICANT CHANGE UP (ref 8.4–10.5)
CHLORIDE SERPL-SCNC: 104 MMOL/L — SIGNIFICANT CHANGE UP (ref 98–107)
CO2 SERPL-SCNC: 23 MMOL/L — SIGNIFICANT CHANGE UP (ref 22–31)
CREAT SERPL-MCNC: 0.64 MG/DL — SIGNIFICANT CHANGE UP (ref 0.5–1.3)
EOSINOPHIL # BLD AUTO: 0.15 K/UL — SIGNIFICANT CHANGE UP (ref 0–0.5)
EOSINOPHIL NFR BLD AUTO: 2.4 % — SIGNIFICANT CHANGE UP (ref 0–6)
GLUCOSE BLDC GLUCOMTR-MCNC: 107 MG/DL — HIGH (ref 70–99)
GLUCOSE BLDC GLUCOMTR-MCNC: 111 MG/DL — HIGH (ref 70–99)
GLUCOSE BLDC GLUCOMTR-MCNC: 115 MG/DL — HIGH (ref 70–99)
GLUCOSE BLDC GLUCOMTR-MCNC: 126 MG/DL — HIGH (ref 70–99)
GLUCOSE BLDC GLUCOMTR-MCNC: 89 MG/DL — SIGNIFICANT CHANGE UP (ref 70–99)
GLUCOSE BLDC GLUCOMTR-MCNC: 95 MG/DL — SIGNIFICANT CHANGE UP (ref 70–99)
GLUCOSE SERPL-MCNC: 115 MG/DL — HIGH (ref 70–99)
HCT VFR BLD CALC: 33.3 % — LOW (ref 34.5–45)
HGB BLD-MCNC: 10.8 G/DL — LOW (ref 11.5–15.5)
HIV 1+2 AB+HIV1 P24 AG SERPL QL IA: SIGNIFICANT CHANGE UP
IANC: 3.67 K/UL — SIGNIFICANT CHANGE UP (ref 1.5–8.5)
IMM GRANULOCYTES NFR BLD AUTO: 0.3 % — SIGNIFICANT CHANGE UP (ref 0–1.5)
LYMPHOCYTES # BLD AUTO: 1.79 K/UL — SIGNIFICANT CHANGE UP (ref 1–3.3)
LYMPHOCYTES # BLD AUTO: 28.5 % — SIGNIFICANT CHANGE UP (ref 13–44)
MAGNESIUM SERPL-MCNC: 2.3 MG/DL — SIGNIFICANT CHANGE UP (ref 1.6–2.6)
MCHC RBC-ENTMCNC: 29.3 PG — SIGNIFICANT CHANGE UP (ref 27–34)
MCHC RBC-ENTMCNC: 32.4 GM/DL — SIGNIFICANT CHANGE UP (ref 32–36)
MCV RBC AUTO: 90.5 FL — SIGNIFICANT CHANGE UP (ref 80–100)
MONOCYTES # BLD AUTO: 0.59 K/UL — SIGNIFICANT CHANGE UP (ref 0–0.9)
MONOCYTES NFR BLD AUTO: 9.4 % — SIGNIFICANT CHANGE UP (ref 2–14)
NEUTROPHILS # BLD AUTO: 3.67 K/UL — SIGNIFICANT CHANGE UP (ref 1.8–7.4)
NEUTROPHILS NFR BLD AUTO: 58.6 % — SIGNIFICANT CHANGE UP (ref 43–77)
NRBC # BLD: 0 /100 WBCS — SIGNIFICANT CHANGE UP
NRBC # FLD: 0 K/UL — SIGNIFICANT CHANGE UP
PHOSPHATE SERPL-MCNC: 3.3 MG/DL — SIGNIFICANT CHANGE UP (ref 2.5–4.5)
PLATELET # BLD AUTO: 183 K/UL — SIGNIFICANT CHANGE UP (ref 150–400)
POTASSIUM SERPL-MCNC: 3.6 MMOL/L — SIGNIFICANT CHANGE UP (ref 3.5–5.3)
POTASSIUM SERPL-SCNC: 3.6 MMOL/L — SIGNIFICANT CHANGE UP (ref 3.5–5.3)
PROT SERPL-MCNC: 6.6 G/DL — SIGNIFICANT CHANGE UP (ref 6–8.3)
RBC # BLD: 3.68 M/UL — LOW (ref 3.8–5.2)
RBC # FLD: 12.6 % — SIGNIFICANT CHANGE UP (ref 10.3–14.5)
SODIUM SERPL-SCNC: 138 MMOL/L — SIGNIFICANT CHANGE UP (ref 135–145)
WBC # BLD: 6.27 K/UL — SIGNIFICANT CHANGE UP (ref 3.8–10.5)
WBC # FLD AUTO: 6.27 K/UL — SIGNIFICANT CHANGE UP (ref 3.8–10.5)

## 2021-11-13 PROCEDURE — 99232 SBSQ HOSP IP/OBS MODERATE 35: CPT | Mod: GC

## 2021-11-13 RX ORDER — INFLUENZA VIRUS VACCINE 15; 15; 15; 15 UG/.5ML; UG/.5ML; UG/.5ML; UG/.5ML
0.5 SUSPENSION INTRAMUSCULAR ONCE
Refills: 0 | Status: DISCONTINUED | OUTPATIENT
Start: 2021-11-13 | End: 2021-11-14

## 2021-11-13 RX ADMIN — ATENOLOL 25 MILLIGRAM(S): 25 TABLET ORAL at 06:21

## 2021-11-13 RX ADMIN — ENOXAPARIN SODIUM 40 MILLIGRAM(S): 100 INJECTION SUBCUTANEOUS at 10:43

## 2021-11-13 RX ADMIN — Medication 81 MILLIGRAM(S): at 10:42

## 2021-11-13 NOTE — PROGRESS NOTE ADULT - PROBLEM SELECTOR PLAN 1
Patient with history of Marfan syndrome, presenting with palpitations   - EP following: PPM interrogation revealed normal device functions without VT or NSVT and PVC's up to 312 beats per hour since March 2021.  Total V pacing around 3%.    - CT of chest ruled out aortic dissection and hematoma.    - Patient has normal LVEF 60% and no hx of heart failure per PPM records  - Admit to tele   -Continue Atenolol 25mg, asa, losartan   -Echocardiogram to for CHF and valvular conditions Patient with history of Marfan syndrome, presenting with palpitations   - EP following: PPM interrogation revealed normal device functions without VT or NSVT and PVC's up to 312 beats per hour since March 2021.  Total V pacing around 3%.    - CT of chest ruled out aortic dissection and hematoma.    - Patient has normal LVEF 60% and no hx of heart failure per PPM records  - continue tele monitoring   -Continue Atenolol 25mg, asa  - losartan d/c, blood pressure has improved   -Echocardiogram 11/13 with acute valvular abnormalities or signs of heart failure

## 2021-11-13 NOTE — PROGRESS NOTE ADULT - SUBJECTIVE AND OBJECTIVE BOX
Interval History: No acute events overnight, reports palpitations have improved since being admitted    Review Of Systems:  Constitutional: [ ] Fever [ ] Chills [ ] Fatigue [ ] Weight change   HEENT: [ ] Blurred vision [ ] Eye Pain [ ] Headache [ ] Runny nose [ ] Sore Throat   Respiratory: [ ] Cough [ ] Wheezing [ ] Shortness of breath  Cardiovascular: [ ] Chest Pain [ ] Palpitations [ ] JACKSON [ ] PND [ ] Orthopnea  Gastrointestinal: [ ] Abdominal Pain [ ] Diarrhea [ ] Constipation [ ] Hemorrhoids [ ] Nausea [ ] Vomiting  Genitourinary: [ ] Nocturia [ ] Dysuria [ ] Incontinence  Extremities: [ ] Swelling [ ] Joint Pain  Neurologic: [ ] Focal deficit [ ] Paresthesias [ ] Syncope  Lymphatic: [ ] Swelling [ ] Lymphadenopathy   Skin: [ ] Rash [ ] Ecchymoses [ ] Wounds [ ] Lesions  Psychiatry: [ ] Depression [ ] Suicidal/Homicidal Ideation [ ] Anxiety [ ] Sleep Disturbances  [x] 10 point review of systems is otherwise negative except as mentioned above    Medications:  acetaminophen     Tablet .. 650 milliGRAM(s) Oral every 6 hours PRN  aspirin  chewable 81 milliGRAM(s) Oral daily  ATENolol  Tablet 25 milliGRAM(s) Oral daily  enoxaparin Injectable 40 milliGRAM(s) SubCutaneous daily  influenza   Vaccine 0.5 milliLiter(s) IntraMuscular once      Vitals:  ICU Vital Signs Last 24 Hrs  T(C): 36.7 (13 Nov 2021 10:00), Max: 37.1 (13 Nov 2021 00:40)  T(F): 98 (13 Nov 2021 10:00), Max: 98.7 (13 Nov 2021 00:40)  HR: 85 (13 Nov 2021 10:02) (80 - 90)  BP: 107/67 (13 Nov 2021 10:02) (101/59 - 122/76)  BP(mean): --  ABP: --  ABP(mean): --  RR: 18 (13 Nov 2021 10:00) (16 - 19)  SpO2: 100% (13 Nov 2021 10:00) (99% - 100%)    Daily Height in cm: 167.64 (13 Nov 2021 00:40)    Daily   I&O's Summary    Physical Exam:  Appearance:  NAD, laying comfortably in bed, Marfan physiology  HENT: NC/AT  Cardiovascular: Regular rate and rhythm, equal S1, S2, no murmurs  Respiratory: No increased work of breathing  Gastrointestinal: Soft  Psychiatry: [x] AAOx3  [x] Follows Commands  Skin: Intact    Labs:                        10.8   6.27  )-----------( 183      ( 13 Nov 2021 07:25 )             33.3     11-13    138  |  104  |  14  ----------------------------<  115<H>  3.6   |  23  |  0.64    Ca    8.6      13 Nov 2021 07:25  Phos  3.3     11-13  Mg     2.30     11-13    TPro  6.6  /  Alb  3.9  /  TBili  0.4  /  DBili  x   /  AST  16  /  ALT  12  /  AlkPhos  47  11-13    PT/INR - ( 12 Nov 2021 13:51 )   PT: 13.1 sec;   INR: 1.16 ratio         PTT - ( 12 Nov 2021 13:51 )  PTT:31.3 sec      Serum Pro-Brain Natriuretic Peptide: 319 pg/mL (11-12 @ 13:51)    Interpretation of Telemetry: sinus rhythm, no increased ectopy

## 2021-11-13 NOTE — DISCHARGE NOTE PROVIDER - NSDCMRMEDTOKEN_GEN_ALL_CORE_FT
aspirin 81 mg oral tablet: 1 tab(s) orally once a day  atenolol 25 mg oral tablet: 1 tab(s) orally once a day  losartan 25 mg oral tablet: 1 tab(s) orally 2 times a day   aspirin 81 mg oral tablet: 1 tab(s) orally once a day  atenolol 25 mg oral tablet: 1 tab(s) orally once a day

## 2021-11-13 NOTE — PROGRESS NOTE ADULT - PROBLEM SELECTOR PLAN 2
Patient with a history of Marfan syndrome  - management of palpitations as above   - CT with multilobulated meningoceles within the sacrum along with dural ectasia.   - patient previously referred for MRI but has not had scan   - neuro consult 11/13 Patient with a history of Marfan syndrome  - management of palpitations as above   - CT with multilobulated meningoceles within the sacrum along with dural ectasia.   - patient previously referred for MRI but has not had scan   - will refer to outpatient neurology

## 2021-11-13 NOTE — DISCHARGE NOTE PROVIDER - NSDCCPTREATMENT_GEN_ALL_CORE_FT
PRINCIPAL PROCEDURE  Procedure: 2D echocardiography  Findings and Treatment:   < end of copied text >  CONCLUSIONS:  1. An annuloplasty ring is seen in the mitral position.  Peak mitral valve gradient equals 9 mm Hg, mean transmitral  valve gradient equals 5 mm Hg, heart rate 91 bpm.  2. Transcatheter aortic valve (by report). Peak transaortic  valve gradient equals 16 mm Hg, mean transaortic valve  gradient equals 10 mm Hg, which is probably normal in the  presence of a prosthetic valve. No aortic valve  regurgitation seen.  3. Small left ventricle.  4. Endocardium not well visualized; grossly normal left  ventricular systolic function.  5. Normal right ventricular size and systolic function. A  device wireis noted in the right heart.< from: Transthoracic Echocardiogram (11.12.21 @ 18:27) >

## 2021-11-13 NOTE — DISCHARGE NOTE PROVIDER - CARE PROVIDER_API CALL
Brian Yancey)  Cardiac Electrophysiology; Cardiology; Internal Medicine  [phone] [place] [address]  Phone: [phone]  Fax: [phone]  Follow Up Time:

## 2021-11-13 NOTE — PHYSICAL THERAPY INITIAL EVALUATION ADULT - PERTINENT HX OF CURRENT PROBLEM, REHAB EVAL
Patient recently moved from [place] to [place] and started a new job a week prior to admission. On the day before admission patient states she was feeling increased fatigue and shortness of breath at rest. 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 She went to see cardiologist Dr. Albright who sent her to ED to be seen by EP Dr. Yancey for symptomatic PVC's.

## 2021-11-13 NOTE — PHYSICAL THERAPY INITIAL EVALUATION ADULT - ADDITIONAL COMMENTS
Patient lives with her mother, sister and niece in apartment, 1 flight to negotiate to get into apartment. Patient was independent in all ADL prior to admission. Patient was not using assistive device prior to admission.

## 2021-11-13 NOTE — DISCHARGE NOTE PROVIDER - NSDCCPCAREPLAN_GEN_ALL_CORE_FT
PRINCIPAL DISCHARGE DIAGNOSIS  Diagnosis: Symptomatic PVCs  Assessment and Plan of Treatment: You came in for abnormal heart beats. Your medication, losartan was stopped. You had an echocardiogram done which was normal. Your symptoms resolved and you were able to walk without symptoms or abnormal heart beats.

## 2021-11-13 NOTE — PROGRESS NOTE ADULT - SUBJECTIVE AND OBJECTIVE BOX
PROGRESS NOTE:   Authored by Rosana Castellano MD PGY-1  Pager [phone] [facility], [address] [facility]   Please page night float  after 7PM    Patient is a 26y old  Female who presents with a chief complaint of Palpitations (12 Nov 2021 17:48)      SUBJECTIVE / OVERNIGHT EVENTS:    ADDITIONAL REVIEW OF SYSTEMS:    MEDICATIONS  (STANDING):  aspirin  chewable 81 milliGRAM(s) Oral daily  ATENolol  Tablet 25 milliGRAM(s) Oral daily  enoxaparin Injectable 40 milliGRAM(s) SubCutaneous daily  influenza   Vaccine 0.5 milliLiter(s) IntraMuscular once    MEDICATIONS  (PRN):  acetaminophen     Tablet .. 650 milliGRAM(s) Oral every 6 hours PRN Mild Pain (1 - 3), Moderate Pain (4 - 6), Severe Pain (7 - 10)      CAPILLARY BLOOD GLUCOSE      POCT Blood Glucose.: 126 mg/dL (13 Nov 2021 03:02)  POCT Blood Glucose.: 132 mg/dL (12 Nov 2021 23:48)  POCT Blood Glucose.: 132 mg/dL (12 Nov 2021 19:50)  POCT Blood Glucose.: 217 mg/dL (12 Nov 2021 19:17)  POCT Blood Glucose.: 56 mg/dL (12 Nov 2021 18:33)  POCT Blood Glucose.: 51 mg/dL (12 Nov 2021 18:29)    I&O's Summary      PHYSICAL EXAM:  Vital Signs Last 24 Hrs  T(C): 37.1 (13 Nov 2021 06:20), Max: 37.1 (13 Nov 2021 00:40)  T(F): 98.7 (13 Nov 2021 06:20), Max: 98.7 (13 Nov 2021 00:40)  HR: 80 (13 Nov 2021 06:20) (80 - 90)  BP: 112/60 (13 Nov 2021 06:20) (101/59 - 122/76)  BP(mean): --  RR: 18 (13 Nov 2021 06:20) (16 - 19)  SpO2: 100% (13 Nov 2021 06:20) (99% - 100%)    CONSTITUTIONAL: NAD, well-developed  RESPIRATORY: Normal respiratory effort; lungs are clear to auscultation bilaterally  CARDIOVASCULAR: Regular rate and rhythm, normal S1 and S2, no murmur/rub/gallop; No lower extremity edema; Peripheral pulses are 2+ bilaterally  ABDOMEN: Nontender to palpation, normoactive bowel sounds, no rebound/guarding  MUSCULOSKELETAL: no clubbing or cyanosis of digits; no joint swelling or tenderness to palpation  PSYCH: A+O to person, place, and time; affect appropriate    LABS:                        11.7   10.82 )-----------( 194      ( 12 Nov 2021 19:35 )             34.8     11-12    135  |  101  |  14  ----------------------------<  183<H>  3.7   |  20<L>  |  0.53    Ca    9.1      12 Nov 2021 19:35  Phos  2.7     11-12  Mg     2.20     11-12    TPro  7.7  /  Alb  4.7  /  TBili  0.5  /  DBili  x   /  AST  19  /  ALT  13  /  AlkPhos  55  11-12    PT/INR - ( 12 Nov 2021 13:51 )   PT: 13.1 sec;   INR: 1.16 ratio         PTT - ( 12 Nov 2021 13:51 )  PTT:31.3 sec      Urinalysis Basic - ( 12 Nov 2021 19:35 )    Color: Light Yellow / Appearance: Clear / SG: >1.050 / pH: x  Gluc: x / Ketone: Large  / Bili: Negative / Urobili: <2 mg/dL   Blood: x / Protein: Trace / Nitrite: Negative   Leuk Esterase: Negative / RBC: x / WBC x   Sq Epi: x / Non Sq Epi: x / Bacteria: x          RADIOLOGY & ADDITIONAL TESTS:  Results Reviewed:   Imaging Personally Reviewed:  Electrocardiogram Personally Reviewed:    COORDINATION OF CARE:  Care Discussed with Consultants/Other Providers [Y/N]:  Prior or Outpatient Records Reviewed [Y/N]:   PROGRESS NOTE:   Authored by Rosana Castellano MD PGY-1  Pager [phone] [facility], [address] [facility]   Please page night float  after 7PM    Patient is a 26y old  Female who presents with a chief complaint of Palpitations (12 Nov 2021 17:48)      SUBJECTIVE / OVERNIGHT EVENTS: No acute events overnight. Tele overnight sinus 90s-110s with no PVCs. Patient seen and examined at bedside. She denies chest pain and dyspnea at rest today. Denies palpitations. States she feels short of breath with exertion.     ADDITIONAL REVIEW OF SYSTEMS: negative     MEDICATIONS  (STANDING):  aspirin  chewable 81 milliGRAM(s) Oral daily  ATENolol  Tablet 25 milliGRAM(s) Oral daily  enoxaparin Injectable 40 milliGRAM(s) SubCutaneous daily  influenza   Vaccine 0.5 milliLiter(s) IntraMuscular once    MEDICATIONS  (PRN):  acetaminophen     Tablet .. 650 milliGRAM(s) Oral every 6 hours PRN Mild Pain (1 - 3), Moderate Pain (4 - 6), Severe Pain (7 - 10)      CAPILLARY BLOOD GLUCOSE      POCT Blood Glucose.: 126 mg/dL (13 Nov 2021 03:02)  POCT Blood Glucose.: 132 mg/dL (12 Nov 2021 23:48)  POCT Blood Glucose.: 132 mg/dL (12 Nov 2021 19:50)  POCT Blood Glucose.: 217 mg/dL (12 Nov 2021 19:17)  POCT Blood Glucose.: 56 mg/dL (12 Nov 2021 18:33)  POCT Blood Glucose.: 51 mg/dL (12 Nov 2021 18:29)    I&O's Summary      PHYSICAL EXAM:  Vital Signs Last 24 Hrs  T(C): 37.1 (13 Nov 2021 06:20), Max: 37.1 (13 Nov 2021 00:40)  T(F): 98.7 (13 Nov 2021 06:20), Max: 98.7 (13 Nov 2021 00:40)  HR: 80 (13 Nov 2021 06:20) (80 - 90)  BP: 112/60 (13 Nov 2021 06:20) (101/59 - 122/76)  BP(mean): --  RR: 18 (13 Nov 2021 06:20) (16 - 19)  SpO2: 100% (13 Nov 2021 06:20) (99% - 100%)    CONSTITUTIONAL: NAD, well-developed  RESPIRATORY: Normal respiratory effort; lungs are clear to auscultation bilaterally  CARDIOVASCULAR: Tachycardic, systolic murmur following TAVR. No LE edema Peripheral pulses are 2+ bilaterally  ABDOMEN: Nontender to palpation, normoactive bowel sounds, no rebound/guarding  MUSCULOSKELETAL: no clubbing or cyanosis of digits; no joint swelling or tenderness to palpation  PSYCH: A+O to person, place, and time; affect appropriate    LABS:                        11.7   10.82 )-----------( 194      ( 12 Nov 2021 19:35 )             34.8     11-12    135  |  101  |  14  ----------------------------<  183<H>  3.7   |  20<L>  |  0.53    Ca    9.1      12 Nov 2021 19:35  Phos  2.7     11-12  Mg     2.20     11-12    TPro  7.7  /  Alb  4.7  /  TBili  0.5  /  DBili  x   /  AST  19  /  ALT  13  /  AlkPhos  55  11-12    PT/INR - ( 12 Nov 2021 13:51 )   PT: 13.1 sec;   INR: 1.16 ratio         PTT - ( 12 Nov 2021 13:51 )  PTT:31.3 sec      Urinalysis Basic - ( 12 Nov 2021 19:35 )    Color: Light Yellow / Appearance: Clear / SG: >1.050 / pH: x  Gluc: x / Ketone: Large  / Bili: Negative / Urobili: <2 mg/dL   Blood: x / Protein: Trace / Nitrite: Negative   Leuk Esterase: Negative / RBC: x / WBC x   Sq Epi: x / Non Sq Epi: x / Bacteria: x          RADIOLOGY & ADDITIONAL TESTS:  Results Reviewed:   Imaging Personally Reviewed:  Electrocardiogram Personally Reviewed:    COORDINATION OF CARE:  Care Discussed with Consultants/Other Providers [Y/N]: electrophysiology   Prior or Outpatient Records Reviewed [Y/N]:

## 2021-11-13 NOTE — CHART NOTE - NSCHARTNOTEFT_GEN_A_CORE
Notified by RN that patient woke up from nap gasping for breath/felt SOB but quickly resolved afterwards.  Asked RN to recheck vitals: /53, HR 82, SpO2 99% (RR not checked).  Came to bedside, lungs CTAB and patient in no acute distress, resting comfortably, RR estimated to be 14. Will continue to monitor.    GABBY, PGY2  Internal Medicine

## 2021-11-14 ENCOUNTER — TRANSCRIPTION ENCOUNTER (OUTPATIENT)
Age: 26
End: 2021-11-14

## 2021-11-14 VITALS
TEMPERATURE: 98 F | SYSTOLIC BLOOD PRESSURE: 108 MMHG | DIASTOLIC BLOOD PRESSURE: 66 MMHG | HEART RATE: 80 BPM | RESPIRATION RATE: 16 BRPM | OXYGEN SATURATION: 100 %

## 2021-11-14 LAB
ALBUMIN SERPL ELPH-MCNC: 4.1 G/DL — SIGNIFICANT CHANGE UP (ref 3.3–5)
ALP SERPL-CCNC: 47 U/L — SIGNIFICANT CHANGE UP (ref 40–120)
ALT FLD-CCNC: 11 U/L — SIGNIFICANT CHANGE UP (ref 4–33)
ANION GAP SERPL CALC-SCNC: 11 MMOL/L — SIGNIFICANT CHANGE UP (ref 7–14)
AST SERPL-CCNC: 12 U/L — SIGNIFICANT CHANGE UP (ref 4–32)
BASOPHILS # BLD AUTO: 0.04 K/UL — SIGNIFICANT CHANGE UP (ref 0–0.2)
BASOPHILS NFR BLD AUTO: 0.8 % — SIGNIFICANT CHANGE UP (ref 0–2)
BILIRUB SERPL-MCNC: 0.3 MG/DL — SIGNIFICANT CHANGE UP (ref 0.2–1.2)
BUN SERPL-MCNC: 11 MG/DL — SIGNIFICANT CHANGE UP (ref 7–23)
CALCIUM SERPL-MCNC: 9.2 MG/DL — SIGNIFICANT CHANGE UP (ref 8.4–10.5)
CHLORIDE SERPL-SCNC: 105 MMOL/L — SIGNIFICANT CHANGE UP (ref 98–107)
CO2 SERPL-SCNC: 23 MMOL/L — SIGNIFICANT CHANGE UP (ref 22–31)
CREAT SERPL-MCNC: 0.51 MG/DL — SIGNIFICANT CHANGE UP (ref 0.5–1.3)
EOSINOPHIL # BLD AUTO: 0.15 K/UL — SIGNIFICANT CHANGE UP (ref 0–0.5)
EOSINOPHIL NFR BLD AUTO: 3 % — SIGNIFICANT CHANGE UP (ref 0–6)
GLUCOSE BLDC GLUCOMTR-MCNC: 86 MG/DL — SIGNIFICANT CHANGE UP (ref 70–99)
GLUCOSE BLDC GLUCOMTR-MCNC: 88 MG/DL — SIGNIFICANT CHANGE UP (ref 70–99)
GLUCOSE BLDC GLUCOMTR-MCNC: 97 MG/DL — SIGNIFICANT CHANGE UP (ref 70–99)
GLUCOSE SERPL-MCNC: 97 MG/DL — SIGNIFICANT CHANGE UP (ref 70–99)
HCT VFR BLD CALC: 35.6 % — SIGNIFICANT CHANGE UP (ref 34.5–45)
HGB BLD-MCNC: 12 G/DL — SIGNIFICANT CHANGE UP (ref 11.5–15.5)
IANC: 2.79 K/UL — SIGNIFICANT CHANGE UP (ref 1.5–8.5)
IMM GRANULOCYTES NFR BLD AUTO: 0.4 % — SIGNIFICANT CHANGE UP (ref 0–1.5)
LYMPHOCYTES # BLD AUTO: 1.51 K/UL — SIGNIFICANT CHANGE UP (ref 1–3.3)
LYMPHOCYTES # BLD AUTO: 30.6 % — SIGNIFICANT CHANGE UP (ref 13–44)
MAGNESIUM SERPL-MCNC: 2.5 MG/DL — SIGNIFICANT CHANGE UP (ref 1.6–2.6)
MCHC RBC-ENTMCNC: 29.3 PG — SIGNIFICANT CHANGE UP (ref 27–34)
MCHC RBC-ENTMCNC: 33.7 GM/DL — SIGNIFICANT CHANGE UP (ref 32–36)
MCV RBC AUTO: 86.8 FL — SIGNIFICANT CHANGE UP (ref 80–100)
MONOCYTES # BLD AUTO: 0.42 K/UL — SIGNIFICANT CHANGE UP (ref 0–0.9)
MONOCYTES NFR BLD AUTO: 8.5 % — SIGNIFICANT CHANGE UP (ref 2–14)
NEUTROPHILS # BLD AUTO: 2.79 K/UL — SIGNIFICANT CHANGE UP (ref 1.8–7.4)
NEUTROPHILS NFR BLD AUTO: 56.7 % — SIGNIFICANT CHANGE UP (ref 43–77)
NRBC # BLD: 0 /100 WBCS — SIGNIFICANT CHANGE UP
NRBC # FLD: 0 K/UL — SIGNIFICANT CHANGE UP
PHOSPHATE SERPL-MCNC: 3 MG/DL — SIGNIFICANT CHANGE UP (ref 2.5–4.5)
PLATELET # BLD AUTO: 193 K/UL — SIGNIFICANT CHANGE UP (ref 150–400)
POTASSIUM SERPL-MCNC: 4.1 MMOL/L — SIGNIFICANT CHANGE UP (ref 3.5–5.3)
POTASSIUM SERPL-SCNC: 4.1 MMOL/L — SIGNIFICANT CHANGE UP (ref 3.5–5.3)
PROT SERPL-MCNC: 6.8 G/DL — SIGNIFICANT CHANGE UP (ref 6–8.3)
RBC # BLD: 4.1 M/UL — SIGNIFICANT CHANGE UP (ref 3.8–5.2)
RBC # FLD: 12.7 % — SIGNIFICANT CHANGE UP (ref 10.3–14.5)
SODIUM SERPL-SCNC: 139 MMOL/L — SIGNIFICANT CHANGE UP (ref 135–145)
WBC # BLD: 4.93 K/UL — SIGNIFICANT CHANGE UP (ref 3.8–10.5)
WBC # FLD AUTO: 4.93 K/UL — SIGNIFICANT CHANGE UP (ref 3.8–10.5)

## 2021-11-14 PROCEDURE — 99238 HOSP IP/OBS DSCHRG MGMT 30/<: CPT | Mod: GC

## 2021-11-14 RX ORDER — ATENOLOL 25 MG/1
1 TABLET ORAL
Qty: 0 | Refills: 0 | DISCHARGE
Start: 2021-11-14

## 2021-11-14 RX ADMIN — ATENOLOL 25 MILLIGRAM(S): 25 TABLET ORAL at 05:50

## 2021-11-14 RX ADMIN — Medication 81 MILLIGRAM(S): at 13:29

## 2021-11-14 NOTE — PROGRESS NOTE ADULT - PROBLEM SELECTOR PLAN 2
Patient with a history of Marfan syndrome  - management of palpitations as above   - CT with multilobulated meningoceles within the sacrum along with dural ectasia.   - patient previously referred for MRI but has not had scan   - will refer to outpatient neurology

## 2021-11-14 NOTE — PROGRESS NOTE ADULT - SUBJECTIVE AND OBJECTIVE BOX
Interval History: No significant PVCs noted overnight, patient denies recurrent symptoms however has not ambulated    Review Of Systems:  Constitutional: [ ] Fever [ ] Chills [ ] Fatigue [ ] Weight change   HEENT: [ ] Blurred vision [ ] Eye Pain [ ] Headache [ ] Runny nose [ ] Sore Throat   Respiratory: [ ] Cough [ ] Wheezing [ ] Shortness of breath  Cardiovascular: [ ] Chest Pain [ ] Palpitations [ ] JACKSON [ ] PND [ ] Orthopnea  Gastrointestinal: [ ] Abdominal Pain [ ] Diarrhea [ ] Constipation [ ] Hemorrhoids [ ] Nausea [ ] Vomiting  Genitourinary: [ ] Nocturia [ ] Dysuria [ ] Incontinence  Extremities: [ ] Swelling [ ] Joint Pain  Neurologic: [ ] Focal deficit [ ] Paresthesias [ ] Syncope  Lymphatic: [ ] Swelling [ ] Lymphadenopathy   Skin: [ ] Rash [ ] Ecchymoses [ ] Wounds [ ] Lesions  Psychiatry: [ ] Depression [ ] Suicidal/Homicidal Ideation [ ] Anxiety [ ] Sleep Disturbances  [x] 10 point review of systems is otherwise negative except as mentioned above    Medications:  acetaminophen     Tablet .. 650 milliGRAM(s) Oral every 6 hours PRN  aspirin  chewable 81 milliGRAM(s) Oral daily  ATENolol  Tablet 25 milliGRAM(s) Oral daily  enoxaparin Injectable 40 milliGRAM(s) SubCutaneous daily  influenza   Vaccine 0.5 milliLiter(s) IntraMuscular once      Vitals:  ICU Vital Signs Last 24 Hrs  T(C): 36.7 (14 Nov 2021 15:04), Max: 36.9 (13 Nov 2021 16:49)  T(F): 98 (14 Nov 2021 15:04), Max: 98.5 (13 Nov 2021 16:49)  HR: 80 (14 Nov 2021 15:04) (72 - 86)  BP: 108/66 (14 Nov 2021 15:04) (100/61 - 114/74)  BP(mean): --  ABP: --  ABP(mean): --  RR: 16 (14 Nov 2021 15:04) (16 - 18)  SpO2: 100% (14 Nov 2021 15:04) (97% - 100%)    Daily     Daily   I&O's Summary      Physical Exam:  Appearance:  NAD, laying comfortably in bed, Marfan physiology  HENT: NC/AT  Cardiovascular: Regular rate and rhythm, equal S1, S2  Respiratory: No increased work of breathing  Gastrointestinal: Soft  Psychiatry: [x] AAOx3  [x] Follows Commands  Skin: Intact    Labs:                        12.0   4.93  )-----------( 193      ( 14 Nov 2021 07:24 )             35.6     11-14    139  |  105  |  11  ----------------------------<  97  4.1   |  23  |  0.51    Ca    9.2      14 Nov 2021 07:24  Phos  3.0     11-14  Mg     2.50     11-14    TPro  6.8  /  Alb  4.1  /  TBili  0.3  /  DBili  x   /  AST  12  /  ALT  11  /  AlkPhos  47  11-14    Serum Pro-Brain Natriuretic Peptide: 319 pg/mL (11-12 @ 13:51)    Echo:  < from: Transthoracic Echocardiogram (11.12.21 @ 18:27) >  CONCLUSIONS:  1. An annuloplasty ring is seen in the mitral position.  Peak mitral valve gradient equals 9 mm Hg, mean transmitral  valve gradient equals 5 mm Hg, heart rate 91 bpm.  2. Transcatheter aortic valve (by report). Peak transaortic  valve gradient equals 16 mm Hg, mean transaortic valve  gradient equals 10 mm Hg, which is probably normal in the  presence of a prosthetic valve. No aortic valve  regurgitation seen.  3. Small left ventricle.  4. Endocardium not well visualized; grossly normal left  ventricular systolic function.  5. Normal right ventricular size and systolic function. A  device wireis noted in the right heart.    < end of copied text >    Interpretation of Telemetry: sinus rhythm, no PVCs

## 2021-11-14 NOTE — CHART NOTE - NSCHARTNOTEFT_GEN_A_CORE
Cardiology recommended sending patient home with event monitor, per  explained that would be unable to arrange today and patient would need to stay at least until tomorrow. Patient very eager to go home and does not want to wait for event monitor. Patient given number to call to make appointment with EP Dr Yancey and she endorsed that she will follow up.

## 2021-11-14 NOTE — PROGRESS NOTE ADULT - PROBLEM SELECTOR PLAN 3
Diet: regular   DVT prophylaxis: lovenox   Dispo: home
Diet: regular   DVT prophylaxis: lovenox   Dispo: home

## 2021-11-14 NOTE — PROGRESS NOTE ADULT - ATTENDING COMMENTS
26 y.o. F w/ a hx of Marfan’s syndrome s/p aortic root repair, MVR, TAVR, scoliosis, high degree AVB s/p PPM who presents with acute on chronic SOB noted to have frequent PVC's.   Patient reports JACKSON is unchanged. PE unchanged. Tele: no events overnight. TTE w/o valvular disease.     # SOB: No sig valvular disease.  EP following- will ambulate and monitor tele. If no sig ectopy, plan to DC  home today.   # Meningocele: F/u OP.
26 y.o. F w/ a hx of Marfan’s syndrome s/p aortic root repair, MVR, TAVR, scoliosis, high degree AVB s/p PPM who presents with acute on chronic SOB noted to have frequent PVC's.   Patient reports SOB is improved though not back to baseline. PE patient in NAD, becomes visibly SOB with ambulation. Labs unremarkable. Tele: no events overnight.     # SOB: Check TTE to eval valvular function. EP following- SOB maybe 2/2 ectopy with ambulation. Monitor on tele.   # Meningocele: F/u OP.

## 2021-11-14 NOTE — PROGRESS NOTE ADULT - ASSESSMENT
26 year old female with a PMH of Marfan syndrome s/p MVR 2012 and s/p AVR and ascending aortic repair in 2017 s/p transcatheter aortic valve replacement (TAVR) 3/2021 and s/p PPM ([employer]) for occasional 2 rd and 3 rd degree AVB post TAVR in 3/28/2021, scoliosis s/p repair with rods, s/p limbs deformatum with chito foot surgery and R hand surgery who presented with symptomatic PVCs    #Symptomatic PVCs  - PPM interrogation revealed normal device functions without VT or NSVT and PVC's up to 312 beats per hour since March 2021. Total V pacing around 3%  - No significant ectopy noted on telemetry monitor overnight  - Recommend patient ambulate today to assess increase in ectopy burden with exertion  - Continue Atenolol 25mg, may uptitrate to 50mg daily for frequent PVC's  - Echocardiogram to assess valvular conditions   - Continue to monitor on telemetry  - Based on PVC burden, patient may be a candidate for PVC's ablation vs antiarrhythmic drug for ectopy suppression    Magaly Burnett MD  Cardiology Fellow  [phone]  All Cardiology service information can be found 24/7 on [email or web address], password: [email or web address]
26 year old female with a PMH of Marfan syndrome presenting with chest pain and palpitations, referred to [facility] ED by cardiologist Dr. Albright for evaluation by EP Dr. Yancey. 
26 year old female with a PMH of Marfan syndrome presenting with chest pain and palpitations, referred to [facility] ED by cardiologist Dr. Albright for evaluation by EP Dr. Yancey. 
26 year old female with a PMH of Marfan syndrome s/p MVR 2012 and s/p AVR and ascending aortic repair in 2017 s/p transcatheter aortic valve replacement (TAVR) 3/2021 and s/p PPM ([employer]) for occasional 2 rd and 3 rd degree AVB post TAVR in 3/28/2021, scoliosis s/p repair with rods, s/p limbs deformatum with chito foot surgery and R hand surgery who presented with symptomatic PVCs    #Symptomatic PVCs  - PPM interrogation revealed normal device functions without VT or NSVT and PVC's up to 312 beats per hour since March 2021. Total V pacing around 3%  - Echocardiogram without significant valvular dysfunction, normal LV systolic function  - No significant ectopy noted on telemetry for past 24 hours  - Recommend patient ambulate today to assess increase in ectopy burden with exertion  - Continue Atenolol 25mg  - If patient comfortable with discharge, please arrange patient for follow up with Dr. Brian Yancey as an outpatient, would arrange for a 30 day event monitor for symptom correlation and PVC burden if possible    Magaly Burnett MD  Cardiology Fellow  [phone]  All Cardiology service information can be found 24/7 on [email or web address], password: [employer]

## 2021-11-14 NOTE — PROGRESS NOTE ADULT - PROBLEM SELECTOR PLAN 1
Patient with history of Marfan syndrome, presenting with palpitations   - EP following: PPM interrogation revealed normal device functions without VT or NSVT and PVC's up to 312 beats per hour since March 2021.  Total V pacing around 3%.    - CT of chest ruled out aortic dissection and hematoma.    - Patient has normal LVEF 60% and no hx of heart failure per PPM records  - continue tele monitoring   -Continue Atenolol 25mg, asa  - losartan d/c, blood pressure has improved   -Echocardiogram 11/13 with acute valvular abnormalities or signs of heart failure Patient with history of Marfan syndrome, presenting with palpitations   - EP following: PPM interrogation revealed normal device functions without VT or NSVT and PVC's up to 312 beats per hour since March 2021.  Total V pacing around 3%.    - CT of chest ruled out aortic dissection and hematoma.    - Patient has normal LVEF 60% and no hx of heart failure per PPM records  - continue tele monitoring   -Continue Atenolol 25mg, asa  - losartan d/c, blood pressure has improved   -Echocardiogram 11/13 without acute valvular abnormalities or signs of heart failure  -patient ambulated ~100 yards without symptoms or PVCs on monitor, per conversation with EP fellow, patient can be discharged with EP followup.

## 2021-11-14 NOTE — PROGRESS NOTE ADULT - SUBJECTIVE AND OBJECTIVE BOX
spoke to EP, if patient can ambulate without symptomatic PVCs can go home and follow as outpatient, otherwise will need to stay spoke to EP, if patient can ambulate without symptomatic PVCs can go home and follow as outpatient, otherwise will need to stay      PROGRESS NOTE:     Patient is a 26y old  Female who presents with a chief complaint of Palpitations (14 Nov 2021 13:34)      SUBJECTIVE / OVERNIGHT EVENTS:  Patient unchanged, continues to report SOB with ambulation. Wants to go home   ADDITIONAL REVIEW OF SYSTEMS:    MEDICATIONS  (STANDING):  aspirin  chewable 81 milliGRAM(s) Oral daily  ATENolol  Tablet 25 milliGRAM(s) Oral daily  enoxaparin Injectable 40 milliGRAM(s) SubCutaneous daily  influenza   Vaccine 0.5 milliLiter(s) IntraMuscular once    MEDICATIONS  (PRN):  acetaminophen     Tablet .. 650 milliGRAM(s) Oral every 6 hours PRN Mild Pain (1 - 3), Moderate Pain (4 - 6), Severe Pain (7 - 10)      CAPILLARY BLOOD GLUCOSE      POCT Blood Glucose.: 88 mg/dL (14 Nov 2021 12:34)  POCT Blood Glucose.: 86 mg/dL (14 Nov 2021 07:18)  POCT Blood Glucose.: 97 mg/dL (14 Nov 2021 03:21)  POCT Blood Glucose.: 89 mg/dL (13 Nov 2021 23:26)  POCT Blood Glucose.: 95 mg/dL (13 Nov 2021 19:33)  POCT Blood Glucose.: 107 mg/dL (13 Nov 2021 15:26)    I&O's Summary      PHYSICAL EXAM:  Vital Signs Last 24 Hrs  T(C): 36.8 (14 Nov 2021 10:25), Max: 36.9 (13 Nov 2021 16:49)  T(F): 98.3 (14 Nov 2021 10:25), Max: 98.5 (13 Nov 2021 16:49)  HR: 79 (14 Nov 2021 10:25) (72 - 86)  BP: 114/58 (14 Nov 2021 10:25) (100/61 - 114/74)  BP(mean): --  RR: 18 (14 Nov 2021 10:25) (18 - 18)  SpO2: 97% (14 Nov 2021 10:25) (97% - 99%)    CONSTITUTIONAL: NAD, well-developed  RESPIRATORY: Normal respiratory effort; lungs are clear to auscultation bilaterally  CARDIOVASCULAR: Regular rate and rhythm, normal S1 and S2, no murmur/rub/gallop; No lower extremity edema; Peripheral pulses are 2+ bilaterally  ABDOMEN: Nontender to palpation, normoactive bowel sounds, no rebound/guarding; No hepatosplenomegaly  MUSCLOSKELETAL: no clubbing or cyanosis of digits; no joint swelling or tenderness to palpation  PSYCH: A+O to person, place, and time; affect appropriate    LABS:                        12.0   4.93  )-----------( 193      ( 14 Nov 2021 07:24 )             35.6     11-14    139  |  105  |  11  ----------------------------<  97  4.1   |  23  |  0.51    Ca    9.2      14 Nov 2021 07:24  Phos  3.0     11-14  Mg     2.50     11-14    TPro  6.8  /  Alb  4.1  /  TBili  0.3  /  DBili  x   /  AST  12  /  ALT  11  /  AlkPhos  47  11-14          Urinalysis Basic - ( 12 Nov 2021 19:35 )    Color: Light Yellow / Appearance: Clear / SG: >1.050 / pH: x  Gluc: x / Ketone: Large  / Bili: Negative / Urobili: <2 mg/dL   Blood: x / Protein: Trace / Nitrite: Negative   Leuk Esterase: Negative / RBC: x / WBC x   Sq Epi: x / Non Sq Epi: x / Bacteria: x          RADIOLOGY & ADDITIONAL TESTS:  Results Reviewed:   Imaging Personally Reviewed:  Electrocardiogram Personally Reviewed:    COORDINATION OF CARE:  Care Discussed with Consultants/Other Providers [Y/N]:  Prior or Outpatient Records Reviewed [Y/N]:       INTERVAL EVENTS/PROGRESS SUMMARY  -spoke to EP, if patient can ambulate without symptomatic PVCs can go home and follow as outpatient, otherwise will need to stay        SUBJECTIVE / OVERNIGHT EVENTS:  - No acute events overnight. Patient seen and evaluated at bedside.  - feels well no complaints      ROS:   Denies fevers/chills, headache, SOB at rest, cough, chest pain, palpitations, abdominal pain, nausea/vomiting/diarrhea/constipation, melena/hematochezia, dysuria, and hematuria    ------------------------------------------------------------------------------------------------------------  MEDICATIONS  (STANDING):  aspirin  chewable 81 milliGRAM(s) Oral daily  ATENolol  Tablet 25 milliGRAM(s) Oral daily  enoxaparin Injectable 40 milliGRAM(s) SubCutaneous daily  influenza   Vaccine 0.5 milliLiter(s) IntraMuscular once    MEDICATIONS  (PRN):  acetaminophen     Tablet .. 650 milliGRAM(s) Oral every 6 hours PRN Mild Pain (1 - 3), Moderate Pain (4 - 6), Severe Pain (7 - 10)    ------------------------------------------------------------------------------------------------------------  OBJECTIVE DATA:    CAPILLARY BLOOD GLUCOSE      POCT Blood Glucose.: 88 mg/dL (14 Nov 2021 12:34)  POCT Blood Glucose.: 86 mg/dL (14 Nov 2021 07:18)  POCT Blood Glucose.: 97 mg/dL (14 Nov 2021 03:21)  POCT Blood Glucose.: 89 mg/dL (13 Nov 2021 23:26)  POCT Blood Glucose.: 95 mg/dL (13 Nov 2021 19:33)  POCT Blood Glucose.: 107 mg/dL (13 Nov 2021 15:26)    I&O's Summary    Daily     Daily   Weight (kg): 27.2 (11-13-21 @ 00:40)  ------------------------------------------------------------------------------------------------------------    PHYSICAL EXAM:  Vital Signs Last 24 Hrs  T(F): 98, Max: 98.5 (11-13-21 @ 16:49)  HR: 80 (72 - 86)  BP: 108/66 (100/61 - 114/74)  RR: 16 (16 - 18)  SpO2: 100% (97% - 100%)    CONST:         NAD, well-appearing; well-developed  EYES:             conjunctiva and sclera clear; PERRL; no conjunctival rim pallor; eyelids without retraction or lag  ENMT:             MMM, no pharyngeal injection or exudates; normal dentition  NECK:               Supple, no palpable masses; no thyromegaly  RESP/CHEST:       Normal respiratory effort; symmetric chest expansion; CTA bilaterally; no W/R/R  CARDIOVASC:       RRR, loud s1, systolic murmur. no JVD  ABDOMEN:      Soft, NT/ND, normoactive bowel sounds, not rigid; no rebound/guarding; No HSM  MSK:             No clubbing or cyanosis of digits; no joint swelling or tenderness to palpation  EXT:               WWP; cap refill <2s; no LE edema; pulses are 2+ B/L; no mottling  PSYCH:         A&O to person, place, and time; affect appropriate  NEURO:       Non-focal; no gross sensory deficits; moving all extremities   SKIN:            No rashes; no palpable lesions      ------------------------------------------------------------------------------------------------------------  LABS:                        12.0   4.93  )-----------( 193      ( 14 Nov 2021 07:24 )             35.6     11-14    139  |  105  |  11  ----------------------------<  97  4.1   |  23  |  0.51    Ca    9.2      14 Nov 2021 07:24  Phos  3.0     11-14  Mg     2.50     11-14    TPro  6.8  /  Alb  4.1  /  TBili  0.3  /  DBili  x   /  AST  12  /  ALT  11  /  AlkPhos  47  11-14          Urinalysis Basic - ( 12 Nov 2021 19:35 )    Color: Light Yellow / Appearance: Clear / SG: >1.050 / pH: x  Gluc: x / Ketone: Large  / Bili: Negative / Urobili: <2 mg/dL   Blood: x / Protein: Trace / Nitrite: Negative   Leuk Esterase: Negative / RBC: x / WBC x   Sq Epi: x / Non Sq Epi: x / Bacteria: x          RADIOLOGY & ADDITIONAL TESTS:  Results Reviewed:     Imaging Reviewed:  Electrocardiogram Reviewed:      COORDINATION OF CARE:  Care discussed with consultants/other providers and notes reviewed [Y]  ------------------------------------------------------------------------------------------------------------  Marcin Richardson MD PGY-2  Internal Medicine  Please contact preferably on Microsoft Teams  Pager ([facility]) [phone] / ([facility]) [phone]  ------------------------------------------------------------------------------------------------------------

## 2021-11-16 LAB
COVID-19 NUCLEOCAPSID GAM AB INTERP: NEGATIVE — SIGNIFICANT CHANGE UP
COVID-19 NUCLEOCAPSID TOTAL GAM ANTIBODY RESULT: 0.07 INDEX — SIGNIFICANT CHANGE UP
COVID-19 SPIKE DOMAIN AB INTERP: POSITIVE
COVID-19 SPIKE DOMAIN ANTIBODY RESULT: 69.5 U/ML — HIGH
SARS-COV-2 IGG+IGM SERPL QL IA: 0.07 INDEX — SIGNIFICANT CHANGE UP
SARS-COV-2 IGG+IGM SERPL QL IA: 69.5 U/ML — HIGH
SARS-COV-2 IGG+IGM SERPL QL IA: NEGATIVE — SIGNIFICANT CHANGE UP
SARS-COV-2 IGG+IGM SERPL QL IA: POSITIVE

## 2021-12-02 RX ORDER — ASPIRIN/CALCIUM CARB/MAGNESIUM 324 MG
1 TABLET ORAL
Qty: 0 | Refills: 0 | DISCHARGE

## 2021-12-02 RX ORDER — ATENOLOL 25 MG/1
1 TABLET ORAL
Qty: 0 | Refills: 0 | DISCHARGE

## 2021-12-02 RX ORDER — LOSARTAN POTASSIUM 100 MG/1
1 TABLET, FILM COATED ORAL
Qty: 0 | Refills: 0 | DISCHARGE

## 2022-03-21 PROBLEM — Z95.2 PRESENCE OF PROSTHETIC HEART VALVE: Chronic | Status: ACTIVE | Noted: 2021-11-12

## 2022-03-21 PROBLEM — Z98.890 OTHER SPECIFIED POSTPROCEDURAL STATES: Chronic | Status: ACTIVE | Noted: 2021-11-12

## 2022-03-21 PROBLEM — Q87.40 MARFAN SYNDROME, UNSPECIFIED: Chronic | Status: ACTIVE | Noted: 2021-11-12

## 2022-03-24 DIAGNOSIS — Z95.4 PRESENCE OF OTHER HEART-VALVE REPLACEMENT: ICD-10-CM

## 2022-03-24 DIAGNOSIS — Z95.2 PRESENCE OF OTHER HEART-VALVE REPLACEMENT: ICD-10-CM

## 2022-03-31 ENCOUNTER — OUTPATIENT (OUTPATIENT)
Dept: OUTPATIENT SERVICES | Facility: HOSPITAL | Age: 27
LOS: 1 days | End: 2022-03-31
Payer: MEDICAID

## 2022-03-31 ENCOUNTER — APPOINTMENT (OUTPATIENT)
Dept: CT IMAGING | Facility: IMAGING CENTER | Age: 27
End: 2022-03-31
Payer: MEDICAID

## 2022-03-31 DIAGNOSIS — Q87.40 MARFAN SYNDROME, UNSPECIFIED: ICD-10-CM

## 2022-03-31 PROBLEM — Z78.9 SOCIAL ALCOHOL USE: Status: ACTIVE | Noted: 2022-03-31

## 2022-03-31 PROBLEM — Z78.9 NON-SMOKER: Status: ACTIVE | Noted: 2022-03-31

## 2022-03-31 PROCEDURE — 74174 CTA ABD&PLVS W/CONTRAST: CPT

## 2022-03-31 PROCEDURE — 74174 CTA ABD&PLVS W/CONTRAST: CPT | Mod: 26

## 2022-03-31 PROCEDURE — 71275 CT ANGIOGRAPHY CHEST: CPT | Mod: 26

## 2022-03-31 PROCEDURE — 71275 CT ANGIOGRAPHY CHEST: CPT

## 2022-03-31 RX ORDER — ATENOLOL 25 MG/1
25 TABLET ORAL DAILY
Qty: 30 | Refills: 0 | Status: ACTIVE | COMMUNITY

## 2022-03-31 RX ORDER — ASPIRIN ENTERIC COATED TABLETS 81 MG 81 MG/1
81 TABLET, DELAYED RELEASE ORAL
Qty: 30 | Refills: 0 | Status: ACTIVE | COMMUNITY

## 2022-03-31 RX ORDER — LOSARTAN POTASSIUM 25 MG/1
25 TABLET, FILM COATED ORAL DAILY
Qty: 60 | Refills: 3 | Status: ACTIVE | COMMUNITY

## 2022-04-01 PROBLEM — Z95.2 HISTORY OF TRANSCATHETER AORTIC VALVE REPLACEMENT (TAVR): Status: ACTIVE | Noted: 2022-04-01

## 2022-04-01 PROBLEM — Q87.40 MARFAN SYNDROME: Status: ACTIVE | Noted: 2017-01-17

## 2022-04-01 PROBLEM — Q87.40 MARFAN'S SYNDROME: Status: RESOLVED | Noted: 2022-04-01 | Resolved: 2022-04-01

## 2022-04-01 PROBLEM — Z98.890 HISTORY OF MITRAL VALVE REPAIR: Status: ACTIVE | Noted: 2022-04-01

## 2022-04-01 PROBLEM — Z95.828 HISTORY OF ASCENDING AORTIC REPLACEMENT: Status: ACTIVE | Noted: 2022-04-01

## 2022-04-04 NOTE — DISCHARGE NOTE NURSING/CASE MANAGEMENT/SOCIAL WORK - PATIENT PORTAL LINK FT
Not sure what her side effects are with the amlodipine. If she wishes to monitor her BP over the next several weeks off of amlodipine and send in readings via portal that is fine as sounds like BP has improved since stopping ibuprofen.    You can access the FollowMyHealth Patient Portal offered by [facility] by registering at the following website: [email or web address]. By joining [employer]’s FollowMyHealth portal, you will also be able to view your health information using other applications (apps) compatible with our system.

## 2022-04-06 ENCOUNTER — APPOINTMENT (OUTPATIENT)
Dept: CARDIOTHORACIC SURGERY | Facility: CLINIC | Age: 27
End: 2022-04-06
Payer: MEDICAID

## 2022-04-06 VITALS
WEIGHT: 128 LBS | HEART RATE: 79 BPM | HEIGHT: 66 IN | TEMPERATURE: 98.3 F | SYSTOLIC BLOOD PRESSURE: 115 MMHG | BODY MASS INDEX: 20.57 KG/M2 | DIASTOLIC BLOOD PRESSURE: 73 MMHG | OXYGEN SATURATION: 98 % | RESPIRATION RATE: 14 BRPM

## 2022-04-06 DIAGNOSIS — Z95.828 PRESENCE OF OTHER VASCULAR IMPLANTS AND GRAFTS: ICD-10-CM

## 2022-04-06 DIAGNOSIS — Z78.9 OTHER SPECIFIED HEALTH STATUS: ICD-10-CM

## 2022-04-06 DIAGNOSIS — Q87.40 MARFAN'S SYNDROME, UNSPECIFIED: ICD-10-CM

## 2022-04-06 DIAGNOSIS — Z95.2 PRESENCE OF PROSTHETIC HEART VALVE: ICD-10-CM

## 2022-04-06 DIAGNOSIS — Z98.890 OTHER SPECIFIED POSTPROCEDURAL STATES: ICD-10-CM

## 2022-04-06 PROCEDURE — 99203 OFFICE O/P NEW LOW 30 MIN: CPT

## 2022-04-08 NOTE — PHYSICAL EXAM
[General Appearance - Alert] : alert [General Appearance - In No Acute Distress] : in no acute distress [General Appearance - Well Nourished] : well nourished [General Appearance - Well Developed] : well developed [Sclera] : the sclera and conjunctiva were normal [PERRL With Normal Accommodation] : pupils were equal in size, round, and reactive to light [Outer Ear] : the ears and nose were normal in appearance [Hearing Threshold Finger Rub Not [place]] : hearing was normal [Both Tympanic Membranes Were Examined] : both tympanic membranes were normal [Neck Appearance] : the appearance of the neck was normal [] : no respiratory distress [Respiration, Rhythm And Depth] : normal respiratory rhythm and effort [Auscultation Breath Sounds / Voice Sounds] : lungs were clear to auscultation bilaterally [Apical Impulse] : the apical impulse was normal [Heart Rate And Rhythm] : heart rate was normal and rhythm regular [Heart Sounds] : normal S1 and S2 [Murmurs] : no murmurs [Examination Of The Chest] : the chest was normal in appearance [2+] : left 2+ [Breast Appearance] : normal in appearance [Bowel Sounds] : normal bowel sounds [Abdomen Soft] : soft [No CVA Tenderness] : no ~M costovertebral angle tenderness [Abnormal Walk] : normal gait [Involuntary Movements] : no involuntary movements were seen [Skin Color & Pigmentation] : normal skin color and pigmentation [Skin Turgor] : normal skin turgor [No Focal Deficits] : no focal deficits [Oriented To Time, Place, And Person] : oriented to person, place, and time [Impaired Insight] : insight and judgment were intact [Affect] : the affect was normal [Mood] : the mood was normal [Memory Recent] : recent memory was not impaired [Memory Remote] : remote memory was not impaired [FreeTextEntry1] : Deferred

## 2022-04-08 NOTE — DATA REVIEWED
[FreeTextEntry1] : 3/31/22 CTA C/A/P revealed There is no intramural hematoma of the thoracic aorta on the precontrast phase. There is no aneurysm or dissection of the thoracic aorta or abdominal aorta. Visualized great vessels are patent. Limited evaluation of the left subclavian artery due to streak artifact from contrast bolus. Iliac arteries are patent. Visceral arteries suboptimally assessed due to streak artifact. Main pulmonary artery size within normal limits. No central pulmonary embolus.\par Measurements of the aorta are as follows (mm x mm, double oblique):\par Ascending segment above TAVR prosthesis: 30 x 30 mm, stable\par Mid ascending segment: 29 x 29 mm, stable\par Proximal descending segment: 22 x 21 mm, stable\par Mid descending segment: 17 x 17 mm, stable\par Diaphragmatic hiatus: 19 x 19 mm, stable\par Infrarenal segment: 14 x 13 mm, stable\par - Also noted mild pectus deformity , aorta is little close to the sternum , pericardial membrane placed in\par 2017 .\par \par 2/2/22 TTE revealed LVEF 52%, Mild mitral regurgitation. A bio prosthetic valve is present in the mitral position. S/P TAVR with normal gradients. Mild tricuspid regurgitation. \par \par 3/15/22 CT lumbar spine w/o IV contrast revealed Marked S- shaped sclerosis again seen with extensive posterior disc fusion.Probable mild loosening involving RT T3 pedicle screw. No acute fracture. Extensive dural ectasia is again seen throughout the spinal canal most severe in the Lumbar sacral region with 2 large cysts protruding into the presacral region, slightly enlarged. \par \par 3/15/22 MR lumbar spine with or without IV contrast revealed susceptibility artifact from the patient's spinal hardware markedly limits evaluation. Again seen is extensive dural ectasia throughout the spine with scalloping of the vertebral bodies and associated bilateral transforaminal meningoceles in the sacrum as described. There is additionally a suggestion of a LT sided spinal extradural arachnoid cyst at the level of L1 measuring approximately 1.9 x 0.6 cm without significant mass effect No fever, rash, or recent illness.  No joint pain/swelling/stiffness.  No eye pain/redness/change in vision.  No sores in the mouth or nose.  No difficulty swallowing.  No chest pain or shortness of breath.  No abdominal complaints or weight loss.  No weakness.  No headaches or focal neurological deficits.  No urinary changes.  No other new symptoms. definite abnormal contrast enhancement or cord signal abnormality within the limitations of the exam .\par \par 2/2/22 Exercise stress test revealed resting segmental wall motion analysis -  Total wall motion score is 1. There is no regional wall motion abnormalities. Post stress segmental wall motion analysis - Total wall motion score is 1. There are no regional wall motion abnormalities. \par

## 2022-04-08 NOTE — ASSESSMENT
[FreeTextEntry1] : Ms. DUNBAR is a 27 year old female with  past medical history of Marfan syndrome, Hx of mitral valve repair using 30 mm [employer] ring in 2010 with Dr. Charles Ba in [facility] , Aortic valve sparing aortic root replacement on 5/2012 with Dr.Emil Ba and Aortic valve repair  on 2/2017 with , Hx of TAVR in 3/2021 in [facility] in [place] , [employer] PPM in 3/2021, Scoliosis ( S/P spinal surgery in 2007),  with complaints of chronic back pain. 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 She was referred to Marfan clinic for her complex cardiac history. She is here for initial evaluation and management for aortic pathology .\par \par She reports that she has chronic back pain and went to [facility] 2 weeks ago . She had CT spine which revealed dural ectasia . She is waiting to see an Orthopedics now. She also reports occasional dizziness which she attributes to medication.\par \par 3/31/22 CTA C/A/P revealed There is no intramural hematoma of the thoracic aorta on the precontrast phase. There is no aneurysm or dissection of the thoracic aorta or abdominal aorta. Visualized great vessels are patent. Limited evaluation of the left subclavian artery due to streak artifact from contrast bolus. Iliac arteries are patent. Visceral arteries suboptimally assessed due to streak artifact. Main pulmonary artery size within normal limits. No central pulmonary embolus.\par Measurements of the aorta are as follows (mm x mm, double oblique):\par Ascending segment above TAVR prosthesis: 30 x 30 mm, stable\par Mid ascending segment: 29 x 29 mm, stable\par Proximal descending segment: 22 x 21 mm, stable\par Mid descending segment: 17 x 17 mm, stable\par Diaphragmatic hiatus: 19 x 19 mm, stable\par Infrarenal segment: 14 x 13 mm, stable\par - Also noted mild pectus deformity , aorta is little close to the sternum , pericardial membrane placed in 2017 .\par \par 2/2/22 TTE revealed LVEF 52%, Mild mitral regurgitation. A bio prosthetic valve is present in the mitral position. S/P TAVR with normal gradients. Mild tricuspid regurgitation. \par \par 3/15/22 CT lumbar spine w/o IV contrast revealed Marked S- shaped sclerosis again seen with extensive posterior disc fusion.Probable mild loosening involving RT T3 pedicle screw. No acute fracture. Extensive dural ectasia is again seen throughout the spinal canal most severe in the Lumbar sacral region with 2 large cysts protruding into the presacral region, slightly enlarged. \par \par 3/15/22 MR lumbar spine with or without IV contrast revealed susceptibility artifact from the patient's spinal hardware markedly limits evaluation. Again seen is extensive dural ectasia throughout the spine with scalloping of the vertebral bodies and associated bilateral transforaminal meningoceles in the sacrum as described. There is additionally a suggestion of a LT sided spinal extradural arachnoid cyst at the level of L1 measuring approximately 1.9 x 0.6 cm without significant mass effect No fever, rash, or recent illness.  No joint pain/swelling/stiffness.  No eye pain/redness/change in vision.  No sores in the mouth or nose.  No difficulty swallowing.  No chest pain or shortness of breath.  No abdominal complaints or weight loss.  No weakness.  No headaches or focal neurological deficits.  No urinary changes.  No other new symptoms. definite abnormal contrast enhancement or cord signal abnormality within the limitations of the exam .\par \par 2/2/22 Exercise stress test revealed resting segmental wall motion analysis -  Total wall motion score is 1. There is no regional wall motion abnormalities. Post stress segmental wall motion analysis - Total wall motion score is 1. There are no regional wall motion abnormalities. \par \par \par  has reviewed the patient's medical records, diagnostic images during the time of this office consultation and have made the following recommendation. The surgical repair is intact and stable. \par \par \par Plan\par \par 1. [facility] with TTE and CTA CAP every other year  .\par 2. Continue medication regimen.\par 3. Follow up with cardiologist ( Dr.Joe Felipe , name and phone number provided) and PCP.\par 4. BP control- I have recommended the patient to monitor his blood pressure closely. I have also advised the patient to take daily blood pressures at home and adhere to medication regimen.\par 5. Discussed signs and symptoms that warrant emergency medical attention \par 6. Follow up with Orthopedics for back pain ( Dr. Víctor John , name and phone number provided) \par \par

## 2022-04-08 NOTE — PROCEDURE
[FreeTextEntry1] :  Dr. Lemons reviewed the indications for surgery, and used our webpage [email or web address].org <[email or web address].heartprocedures.org> to illustrate the aorta and anatomy of the heart. Those indications are the following: size greater than 5.0 cm, symptomatic aneurysms, family history of aortic dissection or aneurysm death with a size greater than 4.5 cm, other necessary cardiac procedures such as coronary artery bypass grafting or valvular disorders with an aneurysm greater than 4.5 cm, or connective tissue disorders with an aneurysm size greater than 4.5 cm. The patient does not meet size criteria for surgical intervention at the time. Patient was advised to view the educational video prior to this visit regarding aortic pathology, risk factors, surgical procedures, and lifestyle modifications. Video can be retrieved at <[email or web address]/[email or web address].[email or web address].\par \par Dr. Lemons discussed activity restrictions with the patient, and would advise exercise at a moderate amount with no heavy lifting over one third of body weight, and avoiding heart rates that exceed 140 beats per minute. In addition, every patient should abstain from tobacco abuse and to avoid all illicit drug use, especially stimulants such as cocaine or methamphetamine. Dr. Lemons also counseled regarding maintaining a healthy heart diet, and losing any excessive weight as this also put undue stress on both the aorta and entire cardiovascular system. First degree family members should be screened for bicuspid valve disease, and ascending aortic aneurysms. \par \par \par

## 2022-04-08 NOTE — HISTORY OF PRESENT ILLNESS
[FreeTextEntry1] : Ms. DUNBAR is a 27 year old female with  past medical history of Marfan syndrome, Hx of mitral valve repair using 30 mm [employer] ring in 2010 with Dr. Charles Ba in [facility] , Aortic valve sparing aortic root replacement on 5/2012 with Dr.Emil Ba and Aortic valve repair  on 2/2017 with , Hx of TAVR in 3/2021 in [facility] in [place] , [employer] PPM in 3/2021, Scoliosis ( S/P spinal surgery in 2007),  with complaints of chronic back pain. 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 She was referred to Marfan clinic for her complex cardiac history. She is here for initial evaluation and management for aortic pathology . \par She reports that she has chronic back pain and went to [facility] 2 weeks ago . She had CT spine which revealed dural ectasia . She is waiting to see an Orthopedics now. She also reports occasional dizziness which she attributes to medication.\par \par Patient is doing well and denies recent  surgeries. He  denies fever, chills, fatigue, headache, blurred vision, dizziness, syncope, chest pain, palpitations, shortness of breath, orthopnea, paroxysmal nocturnal dyspnea, nausea, vomiting, abdominal pain,  BRBPR or swelling to legs.Patient denies any family history of aortic aneurysm or dissection. \par \par \par 3/31/22 CTA C/A/P revealed There is no intramural hematoma of the thoracic aorta on the precontrast phase. There is no aneurysm or dissection of the thoracic aorta or abdominal aorta. Visualized great vessels are patent. Limited evaluation of the left subclavian artery due to streak artifact from contrast bolus. Iliac arteries are patent. Visceral arteries suboptimally assessed due to streak artifact. Main pulmonary artery size within normal limits. No central pulmonary embolus.\par Measurements of the aorta are as follows (mm x mm, double oblique):\par Ascending segment above TAVR prosthesis: 30 x 30 mm, stable\par Mid ascending segment: 29 x 29 mm, stable\par Proximal descending segment: 22 x 21 mm, stable\par Mid descending segment: 17 x 17 mm, stable\par Diaphragmatic hiatus: 19 x 19 mm, stable\par Infrarenal segment: 14 x 13 mm, stable\par - Also noted mild pectus deformity , aorta is little close to the sternum , pericardial membrane placed in 2017 .\par \par

## 2022-04-08 NOTE — CONSULT LETTER
[Dear  ___] : Dear  [unfilled], [FreeTextEntry2] : Toyin Koroma MD\par [address], \par [place], [place]\par  [FreeTextEntry1] : \par I had the pleasure of seeing your patient, PATRICK DUNBAR , in my office today. We take a multidisciplinary team approach to patient care and consider you, the referring physician, an extension of our team. We will maintain an open line of communication with you throughout your patient's treatment course.  \par \par Ms. DUNBAR presents for an initial evaluation and management of thoracic aortic aneurysm. I have reviewed all of her  medical records and diagnostic images at the time of her office consultation. I have enclosed a copy for your records. \par \par I have also reviewed the indications for surgery, and used our webpage <<[email or web address]>> to illustrate the aorta and anatomy of the heart. The patient does not meet size criteria for surgical intervention at the time. \par \par Therefore, I have recommended that the patient will require a follow up appointment in 1 year with [facility]  to monitor her   aortic pathology. My office will assist the patient with her upcoming appointment and I will update you on her progress at that time.\par \par I have discussed with the patient that we will continue to monitor her  aortic pathology closely at the [facility] that encompasses the entire health care system and is one of the largest in the nation at this point.\par \par It is our commitment to provide your patient with the highest quality of advanced therapeutic options. On behalf of the Cardiothoracic Surgery Team, thank you for sending your patient to the [facility] based at [facility].  If there are any questions or concerns, please call me directly at [phone].  \par \par Sincerely, \par \par \par \par \par Torey Lemons M.D.\par Professor of Cardiovascular and Thoracic Surgery\par Minimally Invasive Valve Surgeon\par Director of Aortic Surgery, [facility]\par Cell: [phone]\par Email: [email or web address] \par \par [facility]:\par [address], [address], [place] [address]\par Office: [phone]\par Fax: [phone]\par \par [facility]:\par Department of Cardiovascular and Thoracic Surgery\par [address], [place], [place], [address]\par Office: [phone]\par Fax: [phone]\par \par Practice Manager: Ms. Liz León\par Email: [email or web address]\par Phone: [phone]\par \par \par \par \par \par

## 2022-04-08 NOTE — END OF VISIT
[FreeTextEntry3] : Scribe Attestation:\par I personally scribed for SHARLENE TOMLINSON on Apr 6 2022  8:00AM . \par \par I personally performed the services described in the documentation, reviewed the documentation recorded by the scribe in my presence and it accurately and completely records my words and actions.\par \par \par \par \par \par Physician Attestation:\par Documented by ALVIN KENYON acting as a scribe for SHARLENE TOMLINSON 04/06/2022 . \par                 All medical record entries made by the Scribe were at my, SHARLENE TOMLINSON , direction and personally dictated by me on 04/06/2022 . I have reviewed the chart and agree that the record accurately reflects my personal performance of the history, physical exam, assessment and plan\par

## 2023-03-28 ENCOUNTER — NON-APPOINTMENT (OUTPATIENT)
Age: 28
End: 2023-03-28

## 2023-05-10 ENCOUNTER — APPOINTMENT (OUTPATIENT)
Dept: CARDIOTHORACIC SURGERY | Facility: CLINIC | Age: 28
End: 2023-05-10